# Patient Record
Sex: FEMALE | Race: WHITE
[De-identification: names, ages, dates, MRNs, and addresses within clinical notes are randomized per-mention and may not be internally consistent; named-entity substitution may affect disease eponyms.]

---

## 2017-05-10 NOTE — REPUSA
CLINICAL HISTORY:  Medicare, fall, head injury.

 

TECHNIQUE:  Multiple axial CT images were obtained through the cervical spine without IV contrast mat
erial.  MPR coronal and sagittal sequences were obtained.

 

COMMENTS:

There is no fracture visualized.  The paraspinal soft tissues are unremarkable.  There are no lytic o
r blastic lesions.

 

Grade 1 anterolisthesis of C3 over C4 measures 3 mm.  There is grade 1 anterolisthesis of C4 over C5 
measures 2 mm.  Advanced multilevel degenerative spondylosis is more severe at C4-C5, C5-C6.  At this
 level, there is moderate-to-severe loss of disc space height.  There is posterior-anterior bridging 
osteophytosis, Schmorl's nodes and endplate sclerosis.

 

At C3-C4 through C5-C6 broad-based disc osteophyte complex is present.  There is moderate-to-severe b
ilateral foraminal stenosis and mild-to-moderate canal stenosis.

 

Note is made of a sclerotic lesion measuring 7 mm located in the posterior aspect of C7 vertebral bod
y to the right of the midline.  Consider further evaluation with MRI cervical spine pre- and postcont
rast.  In conjunction with findings on CT of the brain, consider additional evaluation with bone scan
.

 

Incidental note is made of severe biapical fibronodular scarring and scattered emphysematous changes.
  Consider also additional evaluation with chest CT.

 

IMPRESSION:

1. Grade 1 anterolisthesis of C3 over C4 measures 3 mm.  There is grade 1 anterolisthesis of C4 over 
C5 measures 2 mm.  Advanced multilevel degenerative spondylosis is more severe at C4-C5, C5-C6.  At t
his level, there is moderate-to-severe loss of disc space height.  There is posterior-anterior bridgi
ng osteophytosis, Schmorl's nodes and endplate sclerosis.  At C3-C4 through C5-C6 broad-based disc os
teophyte complex is present.  There is moderate-to-severe bilateral foraminal stenosis and mild-to-mo
derate canal stenosis.

2. A sclerotic lesion measuring 7 mm located in the posterior aspect of C7 vertebral body to the righ
t of the midline.  Consider further evaluation with MRI cervical spine pre- and postcontrast.  In con
junction with findings on CT of the brain, consider additional evaluation with bone scan.

3. Severe biapical fibronodular scarring and scattered emphysematous changes.  Consider also addition
al evaluation with chest CT.

 

 

Thank you for your kind referral of this patient. We appreciate the opportunity to participate in thi
s patient's care.

     Electronically signed by JNONATHAN EWING MD on 05/10/2017 08:28:18 PM ET

## 2017-05-10 NOTE — REPUSA
CLINICAL HISTORY: Fall.

TECHNIQUE: Multiple axial CT images were obtained through the brain without IV contrast material.

COMMENTS: 

There is normal configuration of sella turcica. There are no intra or extra-axial collections. There 
is no mass effect or midline shift. There is no evidence of hematoma formation. No hydrocephalus is p
resent. The ventricles are symmetrical. No abnormal calcifications are present. 

There is diffuse age-appropriate cerebellar and cerebral atrophy with proportionally dilated ventricl
es and cortical sulci. 

There are bilateral periventricular and subcortical white matter hypolucencies compatible with mild c
hronic microvascular disease.

Otherwise, no significant focal abnormalities are seen either in the posterior fossa or supratentoria
l compartment.

Note is made of a 12 mm sclerotic lesion in the right skull base which is not fully evaluated.  Consi
josie further evaluation with MRI pre-and postcontrast.

No evidence of fracture.

IMPRESSION:

1. Age-appropriate cerebellar and cerebral atrophy.

2. Mild chronic microvascular disease.

3. No evidence of acute intracranial pathology. 

4. 12 mm sclerotic lesion in the right skull base which is not fully evaluated.  Consider further santana
luation with MRI pre-and postcontrast.

Thank you for your kind referral of this patient.

 

     Electronically signed by JONNATHAN EWING MD on 05/10/2017 07:50:04 PM ET

## 2017-05-11 NOTE — REP
Rib pain after fall.

 

PRIORS:  None.

 

The bones are markedly demineralized.  There is respiratory motion artifact.  A

right lower rib fracture cannot be ruled out.  In addition, there is a subtle

lucency seen involving the scapula.  I cannot assess for fracture on this exam.

 

 

IMPRESSION:

 

Limited exam.  Fractures, as described above, cannot be ruled out.

 

 

Signed by

Vasyl Quintero DO 05/11/2017 03:36 P

## 2017-05-12 NOTE — ED PDOC
Post-Departure Follow-Up


leisa winston fasxed formal report of ct head and c spine for fu mlg Lundborg-Gray,Maja MD May 12, 2017 14:27

## 2017-07-20 NOTE — REP
Left humerus:  Two views.

 

History:  Trauma.

 

Findings:  Two views of the left humerus demonstrate an angulated and comminuted

fracture of the proximal diaphysis of the left humerus.  There is apex medial

angulation and some override.  There is diffuse osteoporosis.

 

 

Signed by

Ruben Maynard MD 07/20/2017 04:39 P

## 2017-07-21 NOTE — REP
Left humerus two views:

 

There is a spiral fracture of the midshaft with angulation, overlapping and

displacement of the fracture fragments.

 

 

Signed by

Tobin Lopez MD 07/21/2017 07:37 A

## 2017-07-21 NOTE — ECGEPIP
Stationary ECG Study

                              [facility]

                                       

                                       Test Date:    2017

Pat Name:     LAURO OJEDA            Department:   

Patient ID:   [id number]                 Room:         -

Gender:       F                        Technician:   PALLAVI

:          1929               Requested By: KATHARINA Mae

Order Number: [id number]     Reading MD:   Mich Zelaya

                                 Measurements

Intervals                              Axis          

Rate:         80                       P:            30

DC:           184                      QRS:          -30

QRSD:         94                       T:            38

QT:           379                                    

QTc:          439                                    

                           Interpretive Statements

SINUS RHYTHM

BORDERLINE LEFT AXIS DEVIATION

 

Electronically Signed On 2017 22:38:33 EDT by Mich Zelaya

## 2017-07-22 NOTE — HPE
DATE OF ADMISSION:  07/21/2017

 

REASON FOR ADMISSION:  Probable grade 1 open fracture proximal humerus left

shoulder.

 

HISTORY OF PRESENT ILLNESS:  She is an 88-year-old right hand dominant female 
who

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

the emergency room at [facility] yesterday morning.  She was seen

by the emergency room physician, Dr. Ruano, who called me and I had her come up

to the office and evaluate her and found to have a proximal one-third long

oblique somewhat comminuted fracture of the left proximal humerus and she was

neurologically intact and not her injuries.  She has just been recovering from

rib fractures.  She did have a bruise on her left chin, but there are no other

neurologic troubles or complaints of pain or soreness and she was placed in a

hanging arm cast with a collar and cuff device.

 

She was sent home from the office and then later that afternoon our cast tech

brought to my attention that when she was done applying the cast that there was 
a

small tiny prick of blood in the shoulder area and put a Band-Aid on it.  
Because

of that I thought that she should be reevaluated as this could have possibly 
become

and open fracture.  I called yesterday afternoon to no avail and then called 
last

in the evening and finally contacted Mrs. Wilson and told her my concern, that we

should reevaluate her, but she did not have any available transportation from up

in [place] until the morning because her son and daughter-in-law were out of

town and instructions were made to not eat breakfast and I will meet her in the

emergency room as early as possible in the morning.  I notified the operating

room to prepare for potential open reduction internal fixation (ORIF) IND of the

left proximal humerus fracture for her.  So, those arrangements were made.

 

This morning, I was evaluating her x-rays again and noted that there were x-rays

again taken of her during the evening and it turns out that her daughter when

they got home from out of town noted that there was bleeding on her gown and arm

and called the on-call physician who instructed her to call 911 and come back to

the emergency room, which she did last evening and was evaluated by the 
emergency

room staff.  There were x-rays done at 2:43 a.m. and was discharged home.  So, I

called the emergency room physician this morning to discuss what he found and he

thought there was just a small amount of bleeding, possibly and abrasion and did

not feel it was an open fracture.

 

Eventually, I was able to get a hold of Mrs. Wilson and her family again this

morning.  I also discussed this with Dr. Cox the on call physician last night
, who also communicated with the ER physician last night.

The daughter states that a small amount of blood was squirting from the armpit

area onto her sweater and her clothes.  I said because of that, I need her to 
get

right back to the hospital again, even though they just left and got home,

because I am suspicious that this has become an open fracture and she would

likely benefit to having this irrigated, debrided and stabilized surgically, so

they were bringing her back in this morning and I am examining her now in the

preoperative holding area.

 

PAST MEDICAL HISTORY:  Otherwise significant for:

High blood pressure.

Anxiety.

Hyperlipidemia.

Mild renal insufficiency.

Hypercholesterolemia.

Osteoporosis.

Vitamin D deficiency.

History of cataracts.

Cystocele.

 

MEDICATIONS:

- Catapres

- hydrochlorothiazide

- metoprolol

- irbesartan

- isosorbide

- simvastatin

- Wellbutrin

- Drisdol

 

PAST SURGICAL HISTORY:

Total abdominal hysterectomy and bilateral salpingo-oophorectomy.

 

SOCIAL HISTORY:  She does not smoke or drink alcohol excessively.  She lives

alone.  She used to smoke cigarettes but quit many years ago.  She has a son and

daughter-in-law who live just down the road from her in  [place], [place].

Marilin Guille cares for her medically in [place].

 

ALLERGIES:  No known drug allergies.

 

REVIEW OF SYSTEMS HEALTH SURVEY:  Amended to the chart and per the last note 
from

06/29/2017 from Marilin Han.  No other real changes.

 

PHYSICAL EXAMINATION:

When I examine her, she is an alert, pleasant, elderly female.  She is

ambulatory.

VITAL SIGNS:  Temperature 97, blood pressure 178/78, pulse 82, respirations 18,

oxygen saturation 93% on room air.

HEENT:  Benign.

LUNGS:  Clear to auscultation.

HEART:  Regular.  I did not detect a murmur.

ABDOMEN:  Nontender.

EXTREMITIES:  Her left arm was swollen and ecchymotic and there was a question 
of

whether or not there is a small pinhole barely perceptible, underneath a

Band-Aid.  I could express just a very small amount of blood from that area

distally.  She is in her long arm hanging cast.  She could bend and straighten

her fingers.  Normal sensation.  Good capillary refill. No motor strength loss

was noted.

 

Her radiographs taken at [facility] last evening were reviewed as

well as the ones yesterday morning.

 

IMPRESSION:  This is an unstable comminuted proximal one-third humerus fracture,

probably a grade 1 open fracture that has become open and I would recommend to

her, and I discussed with her son and daughter-in-law the situation and that is

likely best treated open because of the potential long term risk of infection 
and also would probably help

her heal this better and more comfortably because it is very unstable, but the

risk of doing this of course is a risk of having surgery.  There is risk of

infection, damage to the nerves, blood vessels, anesthetic complications, damage

to the blood vessels and nerves amongst other risks.  They understand this and

Mrs. Wilson understands this, and so she agrees.  She has signed the consent and

we are trying to get her ready for surgery.

 

I have discussed this with the operating room staff as well as the 
representative

from the implant company, [employer], to make sure we have the appropriate

implants available.  I have talked this over with Dr. Neo Cox, orthopedic

surgeon, who may have a chance to come and assist me with this as well.  We plan

to proceed as soon as she is ready.  Labs and EKG are presently pending.

MELODY

## 2017-07-23 NOTE — RO
DATE OF PROCEDURE:  07/22/2017

 

PREPROCEDURE DIAGNOSIS:  Grade 1 open left proximal one-third humerus fracture.

 

POSTPROCEDURE DIAGNOSIS:  Grade 1 open left proximal one-third humerus fracture.

 

 

PROCEDURE:

1.  Irrigation and debridement of grade 1 open proximal humerus fracture.

2.  Open reduction, internal fixation of left proximal one-third humerus fracture

using a proximal humeral locking plate by [employer].

 

SURGEON:  Dr. KATHARINA Nath

 

ASSISTANT:  Neo Cox MD

 

ANESTHESIA:  General endotracheal tube anesthesia.

 

COMPLICATIONS:  None.

 

ESTIMATED BLOOD LOSS:  200 mL.

 

DESCRIPTION OF PROCEDURE:  She was given Kefzol intravenously preoperatively,

then a general endotracheal tube anesthetic was established.  Nickerson catheter was

placed.  She was positioned on the Dustin table and a padded Kennedy stand was

utilized.  A small bump was placed under the left scapula, then her left upper

extremity was carefully prepped and draped in the usual sterile fashion.

 

After appropriate time-out was confirmed, a longitudinal incision was made on the

anterior aspect of the upper arm for an anterolateral deltopectoral approach to

this fracture.  Bovie cautery was used to coagulate the crossing vessels. The

cephalic vein was identified at the deltopectoral interval and the vein was

retracted medially.  The perforating vessels were coagulated.  We developed the

deltopectoral interval around the subacromial space and then distally the biceps

muscle was carefully reflected and retracted medially following along toward the

deltoid insertion distally and then the oblique fracture fragment was noted to

extend through the deltoid incision.  The distal fragment was then identified

deep in the wound and at this point, the brachioradialis was encountered and we

carefully split the brachioradialis down to bone.  We carefully subperiosteally

dissected medially and laterally and then irrigated out the fracture site and

removed all the excess of hematoma.  We utilized the pulse lavage and a 3 liter

bag was used that contained Kefzol antibiotic.

 

Once we thoroughly cleaned the fracture site, an open anatomic line to line

reduction was performed and held with first a small alligator type clamp and

then, once were satisfied with the reduction, we brought in a 8-hole proximal

humeral locking plate into the field and laid it across the fracture and it

appeared to be the appropriate size.  Thus, we used a Verbrugge bone holding

clamp to substitute for the alligator clamp and re-reduced the fracture holding

the plate onto the bone, and then fluoroscopic imaging at this point was obtained

to make sure that we had good alignment of the plate on the bone as well as

proximal distal alignment especially regarding the locking plate screw holes

proximal in the humeral head.  A small guidepin was placed on the top of the head

and we adjusted the plate appropriate so we got good alignment.  We then fixed

the plate into position on either side of the fracture using 3.5 cortical

standard compression screws.  A total of four were placed and then, we place the

interfragmentary compression screw obliquely across the oblique portion of the

fracture fragment using the 2.5 and 3.5 drill, and using a cortical 3.5 screw.

 

At this point, we removed the Verbrugge clamp and then turned our attention

toward the proximal locking screws in the humeral head.  Using the drill sleeves

and then using fluoroscope imaging, we drilled individually each locking screw to

be sure we were at the right depth and the right angle, measured and placed the

locking screws in the humeral head.  Several were placed all with excellent

position.  Then, we confirmed good position by rotating in a lateral, coronal and

saggital planes with the fluoroscope.  The distal holes and the plate were then

filled with 3.5 cortical screws as well.  All screws were then secondarily

tightened to be sure we had good cortical fixation.  We were very satisfied with

the reduction and placement of all the hardware.  We then copiously irrigated

once again the wound and then, we tried to repair the deltoid insertion across

the plate in the mid portion of the incision and then irrigated again and closed

the deep subdermal tissues with interrupted Vicryl sutures and skin was closed

with staples covered by adaptic dry sterile bulky dressing.  She was placed into

a sling and then awakened from general endotracheal tube anesthesia after having

tolerated the procedure well, transferred to the recovery room in stable

condition.  There were no intraoperative complications.

## 2017-09-28 NOTE — REP
C-ARM VIEWS, RIGHT HIP:

 

Four C-ARM views of the right hip were performed during placement of metallic

internal fixation. 77 seconds fluoroscopy time was utilized.

 

 

Signed by

Tobin García MD 09/28/2017 07:57 P

## 2017-09-28 NOTE — ECGEPIP
Stationary ECG Study

                           [facility] - ED

                                       

                                       Test Date:    2017

Pat Name:     LAURO OJEDA            Department:   

Patient ID:   [id number]                 Room:         -

Gender:       F                        Technician:   JT

:          1929               Requested By: KASSIDY PEREZ

Order Number: [id number]     Reading MD:   Deb Ny

                                 Measurements

Intervals                              Axis          

Rate:         77                       P:            89

VA:           183                      QRS:          -23

QRSD:         97                       T:            79

QT:           407                                    

QTc:          463                                    

                           Interpretive Statements

SINUS RHYTHM

NSTTW ABNORMALITY

LOW VOLTAGE LIMB

BASELINE ARTIFACT LIMITS INTERPRETATION

Electronically Signed On 2017 20:48:06 EDT by Deb Ny

## 2017-09-28 NOTE — CR
DATE OF CONSULTATION:   09/28/2017

 

REQUESTING PROVIDER:  Dr. Bradford

 

CHIEF COMPLAINT:   Right hip fracture.

 

HISTORY:  This an 88-year-old woman who fell earlier this morning injuring her

right hip.  She is noted to have a displaced intertrochanteric right hip

fracture.  Other workup so far for fractures has been negative, although a knee

x-ray is pending.  She denies any other injury other than some mild right knee

pain.

 

PAST MEDICAL HISTORY:  Her past medical history is notable for hypertension,

hypercholesterolemia.  She apparently has been cleared from a medical standpoint.

 

 

MEDICATIONS:  At home include:

- vitamin D

- clonidine

- bupropion

- metoprolol

- isosorbide mononitrate

- hydrochlorothiazide

- irbesartan

- calcium

 

ALLERGIES:  No known drug allergies.

 

REVIEW OF SYSTEMS:

Denies any current chest pain, shortness of breath.  Denies any other

musculoskeletal injuries.

Cardiac:  As noted above with some hypertension.  Denies taking any blood

thinners.

 

PHYSICAL EXAMINATION:

She is alert, oriented.  No acute distress.

HEENT:  Extraocular muscles intact.  Pharynx benign.

HEART:  She has a regular rate and rhythm to her pulse.

LUNGS:  Nonlabored breathing.

ABDOMEN:  Benign abdomen.

EXTREMITIES:  Her right lower extremity demonstrates some shortening and external

rotation.  She moves her toes well and has good capillary refill distally.  Does

have multiple varicosities.  She has some mild swelling and redness over the

anterior aspect of her proximal tibia and tibial tubercle region, presumably

where she landed, but is not particularly tender to palpation around the knee.

Hip range of motion was not performed on the right.  She did not seem to be

irritable to range of motion on the left.

 

Radiographs reviewed of her pelvis and both hips.  She has a displaced

intertrochanteric fracture of her right hip.  She apparently had previously

declined a knee x-ray on the right but I am going to try to reorder that and I

have talked to her about that.

 

IMPRESSION:  Right intertrochanteric hip fracture in an 88-year-old woman.

 

RECOMMENDATIONS:  Suggested that we proceed with surgical treatment for this in

order to get her more comfortable and try to get her up ambulating.  She lives

with some family members in what sounds like a duplex type house.  She wished to

go ahead with this.  She understands the nature of this, the risks of bleeding,

infection, damage to nerves, vessels, persistent pain, malunion, nonunion, loss

of reduction, blood clots, medical problems, death, among others.   We will plan

on proceeding with open reduction, internal fixation (ORIF) of the right hip,

most likely with an IM nail, such as a [employer] TFN nail, possibly a plate and

screws.

## 2017-09-28 NOTE — REP
CT Head without contrast

 

HISTORY:  Fall

 

COMPARISON: 05/10/2017

 

Areas of decreased attenuation are present in the periventricular white matter.

This represents small-vessel ischemic disease.  There is no intraparenchymal

hemorrhage, acute infarct,  mass or midline shift. The ventricular system and

cortical sulci as well as subarachnoid space in the posterior fossa are dilated

consistent with minimal volume loss. There is no extra cerebral collection.

There is no fracture.  The visualized sinuses are clear.

 

IMPRESSION:

1.  Small vessel ischemic disease.

2.  Minimal volume loss.

 

 

Signed by

Claudio Jon MD 09/28/2017 09:34 A

## 2017-09-28 NOTE — REP
Chest one-view

 

HISTORY: Chest pain

 

Comparison: 05/10/2017

 

The lungs are clear. The cardiac silhouette is enlarged. The pulmonary

vasculature is normal in appearance.

 

Impression:  Cardiomegaly.

 

 

Signed by

Claudio Jon MD 09/28/2017 10:39 A

## 2017-09-28 NOTE — REP
BILATERAL HIPS:

 

AP and lateral views of bilateral hips performed.  There is an intertrochanteric

fracture of the proximal right femur with varus deformity.  No dislocation is

seen.  There is no fracture or dislocation of the left hip.

 

 

Signed by

Tobin García MD 09/28/2017 07:50 P

## 2017-09-29 NOTE — REP
Clinical:  Trauma.  Fall.

 

Technique:  Single AP view of the pelvis.

 

Findings:

Right femoral neck fracture noted.  Age-related osteopenia and degenerative

changes throughout the visualized lower lumbar spine, pelvis and hips.

 

Impression:

Acute right femoral neck fracture.

 

 

Signed by

Stuart De La Fuente MD 09/29/2017 01:33 A

## 2017-09-29 NOTE — REP
Right hip two views postoperative study:

 

There is gamma nail internal fixation of an intertrochanteric fracture with the

hardware and fracture in satisfactory positions alignment both projections.

 

 

Signed by

Toibn Lopez MD 09/29/2017 10:33 A

## 2017-09-29 NOTE — RO
DATE OF PROCEDURE:  09/28/2017

 

PREOPERATIVE DIAGNOSIS:  Right intertrochanteric hip fracture.

 

POSTOPERATIVE DIAGNOSIS: Right intertrochanteric hip fracture.

 

PROCEDURE PERFORMED:  Open reduction internal fixation of right intertrochanteric

hip fracture with a Synthes TFNA nail

 

SURGEON: Dr. Joseph Rodney.

 

ASSISTANT:

 

ANESTHESIA:  Spinal

 

ESTIMATED BLOOD LOSS:  Less than 100.

 

COMPLICATIONS:  None.

 

INDICATIONS:  This is an 88-year-old woman who fell earlier today injuring her

right hip.  She had a displaced intertrochanteric hip fracture.  She wished to go

ahead with  surgical treatment.  Preop medical clearance was obtained.  She

understood the nature of the procedure as did her family,  the risks of bleeding,

infection, damage to nerves, vessels, persistent pain, malunion, nonunion, loss

of reduction, blood clots, medical problems, death among others.  They understood

that this was a significant injury for somebody this age and it could be very

hard to recover from this both medically and to get people back to previous level

of function.  They wished proceed.

 

DESCRIPTION OF PROCEDURE:  The patient taken to the operating room and  placed on

the fracture table in the usual position.  All areas were padded appropriately.

The spinal anesthesia had been induced.  The right hip was prepped and draped the

usual sterile fashion.  Time-out was performed.  Prior to this, I had taken

several C-arm images and made sure I could visualize the hip okay, which I was

able to do so. The reduction was obtained by placing some gentle traction and

internal rotation on the extremity.

 

Once the hip was prepped and draped, a longitudinal incision was made which was

quite short at the tip of the trochanter.  I then dissected down through the

fascia champ and placed a guide on the tip of the trochanter, advanced the

guidewire and down the canal of the femur, which went in nicely.  I confirmed on

the lateral view that it was down the femur and [id number] on the tip of the

trochanter.  I then used the initiating drill to make a hole the proximal femur,

switched out he guidewires and advanced in the short 11 mm, 130 degree  TFNA

nail.  This was tapped down to a level that I felt was appropriate for the

guidewire to go up into the center of the head.  I actually tapped it in slightly

too far and backed it out slightly.   Then made an incision along the lateral

side of the femur for the guide for the blade to go in.  This was brought up

against the side of the femur and I advanced a guidewire in, made sure it was

centered in both planes in the AP and lateral and then drilled up to 100 mm and

then placed a 95 mm blade, impacted this down and made sure it was seated against

the lateral aspect of the femur.  It looked to be in an excellent position on AP

and lateral in terms of the head. It was within a centimeter of the apex of the

femoral head.  The screw was then used to tighten the inside of the nail down to

the blade and backed off half a turn.  I then placed a locking screw distally by

placing the guide up against the lateral aspect of the femur, drilling, measuring

and placed an appropriate length screw through the daniel.  Final imaging was used

to confirm that the fracture was reduced in anatomic position.  The hardware was

excellent position and the hardware was of appropriate length.

 

I irrigated copiously, closed the deep layer with #1-0 Vicryl suture.  The subcu

with #2-0 Vicryl and the skin with staples.  She was taken to recovery room in

stable condition.  There were no known complications.  The plan will be routine

postop. I am going to go with partial weightbearing with her, postop antibiotics,

deep venous thrombosis (DVT) prophylaxis per routine.

## 2017-09-29 NOTE — IPNPDOC
Text Note


Date of Service


The patient was seen on 9/29/17.





NOTE


Subjective:


Patient is an 88 year old  female with a PMHx of HTN, DLP, CKD3, 

Osteoporosis, Vitamin D deficiency, Depression / Anxiety and Hx of a Cystocele 

who presented to the ER with complaints of right hip pain after she had fallen 

at home. She was found to have a right hip fracture and hospitalist team was 

called to admit, orthopedic surgery was consulted for repair that was completed 

on 9/28/17.





Patient was seen and examined at the bedside. She denies any pain at this time. 

She notes that she will be working with physical therapy today. 





Objective:


Vitals (See below)


General: Lying in bed, no acute distress, comfortable, AAOx3


HEENT: NC, AT


CVS: RRR, +S1S2


Lungs: Fair air entry b/l, -w/r/r


Abdomen: Soft, ND, NT


Extremities: - Edema, - Calf tenderness





Assessment and plan:


Right hip pain - likely 2/2 right hip fracture at femoral neck - s/p ORIF (9/28/ 17 - POD #1)


- Presented after a mechanical fall at home and right hip pain, found to have 

fracture in ER


- Physial with mild right hip pain 


- Dr. Rodney consulted for surgery; pain control and anticoagulation as per 

orthopedic surgery





Elevated WBC / Elevated Platelet Count / Elevated Hg  - possibly 2/2 reactive 

etiology and hemoconcentration, possibly 2/2 hematologic malignancy


- Labs compared to 7/2017 appear consistent with current labs


- Afebrile and review of systems remains negative


- Peripheral smear appears to have a reactive process; no evidence of blast 

cells


- LDH mildly elevated, LAP pending 


- s/p IV fluid hydration


- Will continue to follow





Elevated PT


- Corrected with mixing study; indicating possible deficiency


- Factor VIII and IX pending, Factor 8 function, vWF and Ristocetin Cofactor 

test


- No evidence of bleeding or thrombosis 


- Awaiting lab work 





HTN


- BP remains well controlled 


- c/w Clonidine patch and Metoprolol


- Will hold Irbesartan and HCTZ





DLP


- c/w Simvastatin 





s/p Elevated Cr on CKD3


- Cr baseline appears to be between 1.2 and 1.3


- Cr better than baseline at this time 


- s/p IV fluid hydration 





Osteoporosis


- Will need to f/u outpatient with PCP 





Vitamin D deficiency


- Will hold supplementation 





Depression / Anxiety


- c/w Bupropion 





Hx of a Cystocele





DVT prophylaxis 


- Anticoagulation as per surgery





VS,Fishbone, I+O


VS, Fishbone, I+O


Laboratory Tests


9/29/17 06:28








Red Blood Count 5.98 H, Mean Corpuscular Volume 79.9 L, Mean Corpuscular 

Hemoglobin 25.3 L, Mean Corpuscular Hemoglobin Concent 31.6 L, Red Cell 

Distribution Width 13.4, Monocytes # (Auto) , Calcium Level 8.3 L, Aspartate 

Amino Transf (AST/SGOT) 16, Alanine Aminotransferase (ALT/SGPT) 16, Alkaline 

Phosphatase 72, Total Bilirubin 0.6, Total Protein 5.4 L, Albumin 2.8 L








Vital Signs








  Date Time  Temp Pulse Resp B/P (MAP) Pulse Ox O2 Delivery O2 Flow Rate FiO2


 


9/29/17 10:23   16     


 


9/29/17 09:53  66  113/65    


 


9/29/17 06:00 97.6    96 Nasal Cannula 2.0 

















TOLU METZ MD Sep 29, 2017 13:41

## 2017-09-29 NOTE — REP
Clinical:  Trauma.  Rule out fracture.

 

Technique:  AP and lateral views of the right knee.

 

Findings:

Moderate/early advanced tricompartmental osteoarthritic degenerative changes are

appreciated.  Findings include cortical irregularity, subtle early spurring,

subchondral sclerosis.  No acute fracture or dislocation identified.  No definite

effusion.

 

Impression:

Tricompartmental osteoarthritic degenerative changes.

No acute fracture or dislocation identified.

 

 

Signed by

Stuart De La Fuente MD 09/29/2017 01:10 A

## 2017-09-30 NOTE — IPNPDOC
Text Note


Date of Service


The patient was seen on 9/30/17.





NOTE


Subjective:


Patient is an 88 year old  female with a PMHx of HTN, DLP, CKD3, 

Osteoporosis, Vitamin D deficiency, Depression / Anxiety and Hx of a Cystocele 

who presented to the ER with complaints of right hip pain after she had fallen 

at home. She was found to have a right hip fracture and hospitalist team was 

called to admit, orthopedic surgery was consulted for repair that was completed 

on 9/28/17.





Patient was seen and examined at the bedside. She notes that she worked with 

physical therapy yesterday and has moved around with the walker. She denies any 

other problems. 





Objective:


Vitals (See below)


General: Lying in bed, no acute distress, comfortable, AAOx3


HEENT: NC, AT


CVS: RRR, +S1S2


Lungs: Fair air entry b/l, -w/r/r


Abdomen: Soft, ND, NT


Extremities: - Edema, - Calf tenderness





Assessment and plan:


Right hip pain - likely 2/2 right hip fracture at femoral neck - s/p ORIF (9/28/ 17 - POD #2)


- Presented after a mechanical fall at home and right hip pain, found to have 

fracture in ER


- Physial with mild right hip pain 


- Dr. Rodney consulted for surgery; pain control and anticoagulation as per 

orthopedic surgery





Elevated WBC / Elevated Platelet Count / Elevated Hg  - possibly 2/2 reactive 

etiology and hemoconcentration, possibly 2/2 hematologic malignancy


- Labs compared to 7/2017 appear consistent with current labs


- Afebrile and review of systems remains negative


- Peripheral smear appears to have a reactive process; no evidence of blast 

cells


- LDH mildly elevated, LAP pending 


- s/p IV fluid hydration


- Will continue to follow





Elevated PT


- Continues to remain elevated 


- Corrected with mixing study; indicating possible deficiency


- Factor VIII and IX pending, Factor 8 function, vWF and Ristocetin Cofactor 

test


- No evidence of bleeding or thrombosis 


- Awaiting lab work 





HTN


- BP remains well controlled 


- c/w Clonidine patch and Metoprolol


- Will hold Irbesartan and HCTZ





DLP


- c/w Simvastatin 





s/p Elevated Cr on CKD3


- Cr baseline appears to be between 1.2 and 1.3


- Cr better than baseline at this time 


- s/p IV fluid hydration 





Osteoporosis


- Will need to f/u outpatient with PCP 





Vitamin D deficiency


- Will hold supplementation 





Depression / Anxiety


- c/w Bupropion 





Hx of a Cystocele





DVT prophylaxis 


- Anticoagulation as per surgery





VS,Fishbone, I+O


VS, Fishbone, I+O


Laboratory Tests


9/30/17 06:23








Red Blood Count 5.76 H, Mean Corpuscular Volume 79.3 L, Mean Corpuscular 

Hemoglobin 25.2 L, Mean Corpuscular Hemoglobin Concent 31.7 L, Red Cell 

Distribution Width 13.7, Monocytes # (Auto) , Calcium Level 8.1 L, Aspartate 

Amino Transf (AST/SGOT) 12 L, Alanine Aminotransferase (ALT/SGPT) 13, Alkaline 

Phosphatase 66, Total Bilirubin 0.6, Total Protein 5.4 L, Albumin 2.7 L








Vital Signs








  Date Time  Temp Pulse Resp B/P (MAP) Pulse Ox O2 Delivery O2 Flow Rate FiO2


 


9/30/17 08:15    143/60    


 


9/30/17 06:00 98.8 77 18  93 Room Air  


 


9/29/17 06:00       2.0 

















TOLU METZ MD Sep 30, 2017 11:35

## 2017-10-01 NOTE — IPNPDOC
Text Note


Date of Service


The patient was seen on 10/1/17.





NOTE


Subjective:


Patient is an 88 year old  female with a PMHx of HTN, DLP, CKD3, 

Osteoporosis, Vitamin D deficiency, Depression / Anxiety and Hx of a Cystocele 

who presented to the ER with complaints of right hip pain after she had fallen 

at home. She was found to have a right hip fracture and hospitalist team was 

called to admit, orthopedic surgery was consulted for repair that was completed 

on 9/28/17.





Patient was seen and examined at the bedside. She has been working with 

physical therapy and has been making progress. Advised that we will continue to 

work with physical therapy until we can establish clearance, possibly placement 

in sub-acute rehab if required. 





Objective:


Vitals (See below)


General: Lying in bed, no acute distress, comfortable, AAOx3


HEENT: NC, AT


CVS: RRR, +S1S2


Lungs: Fair air entry b/l, -w/r/r


Abdomen: Soft, ND, NT


Extremities: - Edema, - Calf tenderness, R hip appears clean





Assessment and plan:


Right hip pain - likely 2/2 right hip fracture at femoral neck - s/p ORIF (9/28/ 17 - POD #3)


- Presented after a mechanical fall at home and right hip pain, found to have 

fracture in ER


- Improvement in pain; hip appears clean 


- Dr. Rodney consulted for surgery; pain control and anticoagulation as per 

orthopedic surgery





Elevated WBC / Elevated Platelet Count / Elevated Hg  - possibly 2/2 reactive 

etiology and hemoconcentration, possibly 2/2 hematologic malignancy


- Labs compared to 7/2017 appear consistent with current labs


- Afebrile and review of systems remains negative


- Peripheral smear appears to have a reactive process; no evidence of blast 

cells


- LDH mildly elevated, LAP pending 


- s/p IV fluid hydration


- Will continue to follow





Elevated PT


- Continues to remain elevated 


- Corrected with mixing study; indicating possible deficiency


- Factor VIII / IX level, Factor 8 function, vWF and Ristocetin Cofactor test 

remain pending 


- No evidence of bleeding or thrombosis 


- Awaiting lab work 





HTN


- BP remains well controlled 


- c/w Clonidine patch and Metoprolol


- Will hold Irbesartan and HCTZ





DLP


- c/w Simvastatin 





s/p Elevated Cr on CKD3


- Cr baseline appears to be between 1.2 and 1.3


- Cr better than baseline at this time 


- s/p IV fluid hydration 





Osteoporosis


- Will need to f/u outpatient with PCP 





Vitamin D deficiency


- Will hold supplementation 





Depression / Anxiety


- c/w Bupropion 





Hx of a Cystocele





DVT prophylaxis 


- Anticoagulation as per surgery





Disposition:


- Awaiting PT clearance


- Will discuss with PMD about continuing workup as outpatient





VS,Fishbone, I+O


VS, Fishbone, I+O


Laboratory Tests


10/1/17 05:15








Red Blood Count 5.69 H, Mean Corpuscular Volume 80.5, Mean Corpuscular 

Hemoglobin 25.0 L, Mean Corpuscular Hemoglobin Concent 31.0 L, Red Cell 

Distribution Width 13.4, Monocytes # (Auto) , Calcium Level 8.3 L, Aspartate 

Amino Transf (AST/SGOT) 14 L, Alanine Aminotransferase (ALT/SGPT) 11 L, 

Alkaline Phosphatase 65, Total Bilirubin 0.7, Total Protein 4.9 L, Albumin 2.6 L








Vital Signs








  Date Time  Temp Pulse Resp B/P (MAP) Pulse Ox O2 Delivery O2 Flow Rate FiO2


 


10/1/17 08:43  71  115/56    


 


10/1/17 06:00 96.7  18  96 Room Air  


 


9/29/17 06:00       2.0 

















TOLU METZ MD Oct 1, 2017 13:47

## 2017-10-02 NOTE — IPNPDOC
Text Note


Date of Service


The patient was seen on 10/2/17.





NOTE


Subjective:


Patient is an 88 year old  female with a PMHx of HTN, DLP, CKD3, 

Osteoporosis, Vitamin D deficiency, Depression / Anxiety and Hx of a Cystocele 

who presented to the ER with complaints of right hip pain after she had fallen 

at home. She was found to have a right hip fracture and hospitalist team was 

called to admit, orthopedic surgery was consulted for repair that was completed 

on 9/28/17.





Patient was seen and examined at the bedside. She notes that she has been 

working with physical therapy. She continues to make progress. Denies any other 

problems. 





Objective:


Vitals (See below)


General: Lying in bed, no acute distress, comfortable, AAOx3


HEENT: NC, AT


CVS: RRR, +S1S2


Lungs: Fair air entry b/l, -w/r/r


Abdomen: Soft, ND, NT


Extremities: - Edema, - Calf tenderness, R hip appears clean





Assessment and plan:


Right hip pain - likely 2/2 right hip fracture at femoral neck - s/p ORIF (9/28/ 17 - POD #4)


- Presented after a mechanical fall at home, found to have fracture in ER


- Dr. Rodney consulted for surgery


- Has improvement in pain


- pain control and anticoagulation as per orthopedic surgery


- c/w PT and determine disposition 





Elevated WBC / Elevated Platelet Count / Elevated Hg  - possibly 2/2 reactive 

etiology and hemoconcentration, possibly 2/2 hematologic malignancy


- Labs compared to 7/2017 appear consistent with current labs


- Afebrile and review of systems remains negative


- Peripheral smear appears to have a reactive process; no evidence of blast 

cells


- LDH mildly elevated, LAP score normal 


- Will restart IV fluid hydration


- Will continue to follow CBC trend 





Elevated PTT


- Continues to remain elevated 


- Corrected with mixing study; indicating possible deficiency


- Factor VIII / IX level, Factor 8 function, vWF and Ristocetin Cofactor test 

remain pending 


- No evidence of bleeding or thrombosis 


- Awaiting lab work 





HTN


- BP remains well controlled 


- c/w Clonidine patch and Metoprolol


- Will hold Irbesartan and HCTZ





DLP


- c/w Simvastatin 





Re-Elevated Cr on CKD3


- Cr baseline appears to be between 1.2 and 1.3


- Cr has shown slight elevation from yesterday


- Will restart IV fluid hydration 





Osteoporosis


- Will need to f/u outpatient with PCP 





Vitamin D deficiency


- Will hold supplementation 





Depression / Anxiety


- c/w Bupropion 





Hx of a Cystocele





DVT prophylaxis 


- Anticoagulation as per surgery





Disposition:


- Awaiting PT clearance


- Will discuss with PMD about continuing workup as outpatient





VS,Kelsie, I+O


VS, Kelsie, I+O


Laboratory Tests


10/2/17 07:12








Red Blood Count 5.60 H, Mean Corpuscular Volume 79.6 L, Mean Corpuscular 

Hemoglobin 24.8 L, Mean Corpuscular Hemoglobin Concent 31.2 L, Red Cell 

Distribution Width 13.5, Neutrophils (%) (Auto) 75.3 H, Lymphocytes (%) (Auto) 

5.9 L, Monocytes (%) (Auto) 12.7 H, Eosinophils (%) (Auto) 4.2 H, Basophils (%) 

(Auto) 1.1 H, Neutrophils # (Auto) 13.8 H, Lymphocytes # (Auto) 1.1 L, 

Monocytes # (Auto) 2.3 H, Eosinophils # (Auto) 0.8 H, Basophils # (Auto) 0.2, 

Calcium Level 7.8 L, Aspartate Amino Transf (AST/SGOT) 14 L, Alanine 

Aminotransferase (ALT/SGPT) 12, Alkaline Phosphatase 64, Total Bilirubin 0.9, 

Total Protein 4.9 L, Albumin 2.5 L








Vital Signs








  Date Time  Temp Pulse Resp B/P (MAP) Pulse Ox O2 Delivery O2 Flow Rate FiO2


 


10/2/17 14:00 97.3 56 16 92/54 (67) 93 Room Air  


 


9/29/17 06:00       2.0 














I&O- Last 24 Hours up to 6 AM 


 


 10/3/17





 06:00


 


Intake Total 300 ml


 


Balance 300 ml

















TOLU METZ MD Oct 2, 2017 14:57

## 2017-10-03 NOTE — PMRHPE
DATE OF ADMISSION:  10/03/2017

 

REASON FOR ADMISSION: Rehabilitation of right hip fracture, status post

09/28/2017 fall and then 09/28/2017 open reduction internal fixation of right

intertrochanteric hip fracture with a Synthes TFNA nail.

 

HISTORY OF THE PRESENT ILLNESS: The patient is an 88-year-old white female who 
is

fairly active and was outside walking her dog when she tripped on what she

believed was a tree stump, falling backwards onto her buttock, developing

significant pain in her right hip but not striking her head and having no loss 
of

consciousness or other problems. Her daughter-in-law was unable to get her up

after she was called by the patient and then the patient was taken to the

emergency room after the son arrived home and was able to get the patient into

the car. The patient was evaluated at [facility] 
and

found to be sensory motor intact except for right hip pain, stiffness and

guarding and was evaluated and found to have sustained a right intertrochanteric

fracture. The patient had another fall with left humerus fracture in July 2017,

which also required an open reduction internal fixation. Patient who is

right-handed had basically been living fairly independently, though she does 
live

with her son and daughter-in-law. The patient has been participating in physical

and occupational therapy and making improvements, though she has had a course

somewhat stressed by anemia and required transfusion. The patient, however, had

reached the point where it was felt she would be able to participate in and

benefit from acute intensive rehabilitation to facilitate her return to home.

Therefore, the patient is admitted to the acute rehabilitation unit for a trial

of musculoskeletal rehabilitation today, 10/03/2017.

 

PAST MEDICAL HISTORY:

Includes:

1. Atherosclerotic cardiovascular disease, including hypertension and

hyperlipidemia.

2. Chronic kidney disease, level III.

3. Osteoporosis.

4. Vitamin D deficiency.

5. Depression, anxiety.

6. History of cystocele.

 

PAST SURGICAL HISTORY:

Includes:

Hysterectomy and bilateral salpingo-oophorectomy in 1993.

Bilateral cataract surgery in 2007.

Left humerus fracture and open reduction internal fixation in July of 2017.

 

FAMILY HISTORY:  Noncontributory for this patient.

 

SOCIAL HISTORY:  As noted above, patient who is retired from working in a school

cafeteria, lives with her son and daughter-in-law in [place], [place] and has a

ramp into the house from one entrance and four steps from another, and a couple

areas of 4-inch step-ups in the house to enter different rooms. She does not use

illicit drugs, has a 20 pack-year smoking history but has not smoked in the last

50 years and does drink on rare occasions some alcohol in social settings.

 

ALLERGIES: No known drug allergies.

 

MEDICATIONS ON ADMISSION:

- Tylenol 650 mg every 6 hours for pain or fever

- Wellbutrin  mg daily

- clonidine 0.1 mg per 24-hour patch, changing weekly on Monday, topical agent

- Senokot S one tablet twice a day

- Imdur XR 30 mg daily for hypertension and atherosclerotic cardiovascular

disease

- metoprolol tartrate; I am changing the patient from 100 mg daily to 50 mg 
twice

a day. Metoprolol tartrate is for atherosclerotic cardiovascular disease and

hypertension control.

- simvastatin 20 mg daily for elevated lipids

- Coumadin; patient to receive 2.5 mg nightly in target range of 1.7 to 2.0 for

deep vein thrombosis (DVT) prophylaxis

- oxycodone 5 mg every 6 hours as needed for moderate pain, 10 mg every 6 hours

as needed for severe pain

 

REVIEW OF SYSTEMS: On a 10-point scale is negative except for the hip pain and

difficulties noted above.

 

PHYSICAL EXAMINATION: The patient is a 5 foot tall, 60 kg, 88-year-old white

female who looks stated age and appears to be in mild musculoskeletal distress.

She is well oriented, pleasant and a little hard of hearing.

VITAL SIGNS: Temperature is 98.6, blood pressure 131/62, pulse 84, respirations

18 and pulse oximetry is 96% on room air.

HEENT: Normocephalic, atraumatic using glasses for visual correction. 
Extraocular

motions are intact. Pupils are equal, round, reactive to light and 
accommodation.

Hearing is somewhat decreased. Tongue is midline. No facial asymmetry is noted.

Nares are clear with straight septum.

NECK:  Supple. No carotid bruits appreciated and normal thyroid palpation.

LUNGS: Clear in all fields to auscultation.

CORONARY: Shows a regular rate and rhythm with normal S1 and S2 without S3, S4

murmurs or rubs and 2/4 bilateral radial pulses.

ABDOMEN: Very mildly obese but soft and nontender with no palpable masses and

normal bowel sounds in all quadrants.

EXTREMITIES:  Bilateral upper and left lower extremity with functional range of

motion and no tenderness or difficulty. Distal right lower extremity with good

knee range of motion and ankle range of motion with some guarding around the

right hip and dressings over the surgical incision and screw placement sites 
from

the nail.

NEUROLOGIC: The patient is alert and oriented to person, place, time and

situation. Speech is clear, coherent and appropriate without any dysarthria or

difficulty handling secretions. Affect is slightly anxious but in general

pleasant and cooperative and memory is fair to good. Light touch is intact in

bilateral upper and lower extremities. Vibration is intact in bilateral upper 
and

lower extremities. Tongue is within normal limits in bilateral upper and lower

extremities with 2/4 knee jerks. Biceps, brachial radialis 1/4. Triceps and 
ankle

jerks and downgoing toes on plantar stimulation. Balance appears to be grossly

intact while sitting. The patient was not stood at this time.

 

LABORATORY DATA: Shows elevated white count of 16.7 thousand, which is a

continuation of the leukocytosis previously noted to admission. Hemoglobin and

hematocrit are normal at 13.5 and 43.4 with MCV diminished at 79.9 and platelets

markedly elevated at 626,000, which has also been persistent  thrombocytosis

prior to admission. Neutrophils and monocytes are relatively elevated and

lymphocytes are low, and eosinophils are elevated in number. Electrolytes are

normal. BUN is elevated at 33, creatinine is within normal range at 1.02 with 
GFR

of 54.4 and fasting glucose of 92 this morning. Calcium is reduced at 8.1 
related

to an albumin of 2.4 and total protein of 5.2, which are also reduced. Magnesium

is elevated at 2.8 with normal total and direct bilirubin, mildly low AST, 
normal

ALT and alkaline phosphatase, and normal lipid panel, as well as thyroid panel.

INR is therapeutic at 1.92 today.

 

Postoperative x-ray does show good positioning of the nail and stabilizing 
screws

with alignment of the femoral neck to the femoral shaft.

 

ASSESSMENT AND PLAN:

1. Rehabilitation of right intertrochanteric fracture with open reduction

internal fixation with Synthes TFNA nail: The patient will be started on a trial

of musculoskeletal rehabilitation with physical and occupational therapy to try

to regain her mobility and activities of daily living (ADLs) to allow for return

to the community and her to be able to be self-care. She does have a supportive

family, but she needs to be independent for times when her son and

daughter-in-law are not available. The patient expresses her motivation and

desire to do this and is felt by her prior therapist, as well as the care team 
to

have the potential and ability to participate in acute intensive musculoskeletal

rehabilitation of 3 hours per day. She does have multiple medical problems and

will require rehabilitation nursing, physiatry and medicine consult to review

these. Anticipated length of stay is approximately 7 days.

 

2. Leukocytosis. It is unclear what is causing this. It seems to have persisted

longer than usually seen due to stress, but we will keep an eye on it and a

medicine consultation has been sent to help follow this.

 

3. Thrombocytosis. Again, patient with elevation of platelet count, which seems

to be turning around and heading towards a more normal level. Will continue to

follow this with medicine service.

 

4. Osteoporosis. The patient will continue on treatment and work to rebuild

strength. Will discuss with medicine the possibility of vitamin D and other

medications, even calcium supplementation, to try and help with healing.

 

5. Anxiety/depression. We will continue to observe this and try to provide

appropriate support and continue the patient on Wellbutrin XL.

 

6. Atherosclerotic cardiovascular disease, including hypertension and

hyperlipidemia. Will continue the clonidine 0.1 mg per 24 hour patch along with

isosorbide mononitrate XR 30 mg a day and metoprolol tartrate 50 mg twice a day

and simvastatin 20 mg daily to control lipids.

 

7. Deep vein thrombosis (DVT) prophylaxis. Will go ahead and use thromboembolism

deterrent (SYLVIE) hose plus progress patient's ambulation and try to maintain

appropriate hydration.

 

8. Chronic kidney disease, stage III. Will work with the medicine service on 
this

and to continue to follow regular comprehensive metabolic panels (CMPs) nd basic

metabolic panels (BMPs), just as we will continue with complete blood counts

(CBCs) to follow the leukocytosis and thrombocytosis noted above.

 

9. Pain management. At this time, I will transition the patient away from

Percocet to oxycodone and look to diminish the use of that and consider possible

trial and treatment with tramadol to get away from the more addictive opiate

medications, which may also contribute to diminished bowel function. The patient

is reported to have had bowel movement within the last 2 days, and appropriate

agents such as Fleets enema and Senokot S have been ordered.

 

POST ADMISSION PHYSICIAN EVALUATION: The patient has been consistent with

preadmission screening and does appear to be very motivated to participate in 
and

I believe very capable of benefiting from acute intensive rehabilitation. I am

anticipating there should not be a significant problem with her doing 3 hours of

therapy per day, and I feel she has a good prognosis for being discharged to 
home

with family in approximately 1 week estimated length of stay.

 

Time spent on chart review, history and physical and documentation is greater

than 70 minutes.

ROND

## 2017-10-03 NOTE — DS.PDOC
Discharge Summary


General


Date of Admission


Sep 28, 2017 at 11:21


Date of Discharge


10/3/17


Attending Physician:  DWAINE TITUS MD


Specialist/Consultants Involve:  Joseph Rodney





Discharge Summary


PROCEDURES PERFORMED DURING STAY: Right total hip arthroplasty





ADMITTING/DISCHARGE DIAGNOSES:


1. Mechanical fall with right hip fracture status post right total hip 

arthroplasty


2. Leukocytosis/thrombocytosis/elevated hemoglobin- patient will need 

outpatient hematology/oncology follow-up for further workup


3. Hypertension


4. Hyperlipidemia


5. Chronic kidney disease stage III


6. Osteoporosis


7. Depression/anxiety


8. History of cystocele





COMPLICATIONS/CHIEF COMPLAINT: Hip pain





HISTORY OF PRESENT ILLNESS/HOSPITAL COURSE:


This is a 88-year-old female with past medical history she process/

hyperlipidemia, chronic kidney disease, hypertension who presents with right 

hip pain status post fall. Patient was found to have right hip fracture for 

which she underwent a right hip arthroplasty. Over the course of hospitalization

, the patient's was also noted to have elevated WBC/hemoglobin/platelets, with 

initial workup inconclusive. Patient will need to have outpatient hematology/

oncology workup to exclude malignancy. 





Patient is now hemodynamically stable and ready to be discharged. She will be 

going to [facility] today.





Patient does have a history of osteoporosis and will need close outpatient 

endocrine follow-up, especially in the setting of acute fracture.





DISCHARGE MEDICATIONS: Please see below.





ALLERGIES: Please see below.





PHYSICAL EXAMINATION ON DISCHARGE:


Vitals: (see below) 


General: No acute distress, laying comfortably in bed.


HEENT: Moist mucous membranes.


Neck: No JVD or lymphadenopathy


Cardiac: RRR, No murmurs


Pulm: Clear to auscultation b/l. No wheezing, rhonchi


Abd: NT/ND + BS


Ext: No edema or cyanosis. Right hip with pain with movement. Distal pulses 

intact. No bleeding noted.





LABORATORY DATA: Please see below.





IMAGING: 





PROGNOSIS: Guarded





ACTIVITY: As tolerated.





DIET: Low-sodium





DISCHARGE PLAN/DISPOSITION: PMR





DISCHARGE INSTRUCTIONS:


1. Patient's follow-up with PCP, endocrine, hematology/oncology in 1-2 weeks.





DISCHARGE CONDITION: Stable.





TIME SPENT ON DISCHARGE: Greater than 30 minutes.





Vital Signs/I&Os





Vital Signs








  Date Time  Temp Pulse Resp B/P (MAP) Pulse Ox O2 Delivery O2 Flow Rate FiO2


 


10/3/17 09:41   20     


 


10/3/17 09:10    136/70    


 


10/3/17 09:09  84      


 


10/3/17 06:00 98.6    96 Room Air  


 


9/29/17 06:00       2.0 














I&O- Last 24 Hours up to 6 AM 


 


 10/4/17





 06:00


 


Intake Total 240 ml


 


Balance 240 ml











Laboratory Data


Labs 24H


Laboratory Tests 2


10/3/17 06:28: 


White Blood Count 16.7H, Red Blood Count 5.43H, Hemoglobin 13.5, Hematocrit 43.4

, Mean Corpuscular Volume 79.9L, Mean Corpuscular Hemoglobin 24.9L, Mean 

Corpuscular Hemoglobin Concent 31.1L, Red Cell Distribution Width 13.4, 

Platelet Count 626H, Monocytes # (Auto) , Nucleated Red Blood Cells % (auto) 0.0

, Neutrophils 74, Lymphocytes (Manual) 13L, Monocytes (Manual) 9H, Eosinophils (

Manual) 4, Platelet Estimate INCREASED, Prothrombin Time 22.6H, Prothromb Time 

International Ratio 1.92, Activated Partial Thromboplast Time 63.1H, Anion Gap 

6L, Glomerular Filtration Rate 54.4, Blood Urea Nitrogen 33H, Creatinine 1.02, 

Sodium Level 139, Potassium Level 4.3, Chloride Level 104, Carbon Dioxide Level 

29, Calcium Level 8.1L, Aspartate Amino Transf (AST/SGOT) 10L, Alanine 

Aminotransferase (ALT/SGPT) 13, Alkaline Phosphatase 60, Total Bilirubin 0.6, 

Total Protein 5.2L, Albumin 2.4L, Magnesium Level 2.8H, Albumin/Globulin Ratio 

0.86L


CBC/BMP


Laboratory Tests


10/3/17 06:28








Red Blood Count 5.43 H, Mean Corpuscular Volume 79.9 L, Mean Corpuscular 

Hemoglobin 24.9 L, Mean Corpuscular Hemoglobin Concent 31.1 L, Red Cell 

Distribution Width 13.4, Monocytes # (Auto) , Calcium Level 8.1 L, Aspartate 

Amino Transf (AST/SGOT) 10 L, Alanine Aminotransferase (ALT/SGPT) 13, Alkaline 

Phosphatase 60, Total Bilirubin 0.6, Total Protein 5.2 L, Albumin 2.4 L





Discharge Medications


Scheduled


 (Senna Plus 8.6-50 mg) 1 Tab Tab, 1 TAB PO BID


Bupropion Hcl (Bupropion HCl Xl) 150 Mg Tab, 150 MG PO DAILY, (Reported)


Calcium Citrate (Calcitrate) 950 Mg Tab, 950 MG PO QMONTH, (Reported)


Clonidine Hydrochloride (Clonidine HCl) 0.1 Mg/24 Hr Dis, 0.1 MG TD QWEEK, (

Reported)


   MONDAYS-CURRENTLY ON RIGHT ARM 


Isosorbide Mononitrate (Isosorbide Mononitrate ER) 30 Mg Tab, 30 MG PO DAILY, (

Reported)


Metoprolol Tartrate (Metoprolol Tartrate) 50 Mg Tab, 50 MG PO BID


Polyethylene Glycol (Peg 3350) 1 Pkt Pow, 1 PKT PO DAILY


Simvastatin (Simvastatin) 20 Mg Tab, 20 MG PO DAILY, (Reported)


Vitamin D (Drisdol) 50,000 Unit Cap, 50,000 UNIT PO QMONTH, (Reported)





Scheduled PRN


Acetaminophen (Mapap) 325 Mg Tab, 650 MG PO Q4HP PRN for TEMP >101


Ondansetron HCl (Ondansetron HCl) 4 Mg Tab, 4 MG PO Q4HP PRN for NAUSEA OR 

VOMITING


Oxycodone/Acetaminophen (Percocet 5MG/325MG Tablet) 1 Tab Tab, 2 TAB PO Q4HP 

PRN for SEVERE PAIN (PS 8-10)


Sodium Phosphate/Biphosphate (Fleet Enema 7-19 gm/118Ml) 1 Gisele Gisele, 0 EA MI 

DAILYPRN PRN for CONSTIPATION





Allergies


Coded Allergies:  


     No Known Allergies (Unverified , 5/10/17)











DWAINE TITUS MD Oct 3, 2017 15:48

## 2017-10-04 NOTE — CR.PDOC
[facility] Consultation


Consultation


CONSULTATION REPORT FOR: Dr Yeh


REASON FOR CONSULTATION: Medical Management


DATE OF VISIT: 10/4/17


ATTENDING: Dr. Mich Whitley








HPI: Patient is an 88 year old  female with a PMHx of HTN, DLP, CKD3, 

Osteoporosis, Vitamin D deficiency, Depression, Anxiety and Hx of a Cystocele 

who presented to the ER 9/28/17 with complaints of right hip pain after she had 

mechanical fall at home. She was found to have a right hip fracture, orthopedic 

surgery completed Rt hip ORIF on 9/28/17.


Pt is transferred to the care of DENISA Matthews, 10/3/17. 


Pt feels tired however no other complaints today. 


 Denies any fevers, chills, weakness, fatigue, Headache, Chest Pain, Shortness 

of breath, cough, palpitations, abdominal pain, N/V/D or changes in bowel or 

bladder habits.





PMHx: 


HTN


HLD


CKD3


osteoporosis


Vitamin D Def








PSHX:


Hysterectomy and bilateral salpingo-opherectomy (1993)


Bilateral cataract surgery (2007)


Left humerus fracture  s/p ORIF (7/2017)


Cystocele (not corrected)











SOCHX:


Denies the use of illicit drugs; Quit smoking 50 years prior, smoker of 20 

years at 1 ppd, Social alcohol use 


Denies recent travel or sick contacts


Lives with son and daughter in law


Occupation; Retired from school cafeteria








ROS:


As noted in HPI, otherwise 11pt ROS of systems reviewed and unremarkable.


                 


PE:      


GEN:  88yoF, appears stated age. Well-nourished, well developed. No acute 

distress. Alert and oriented x 3. Pleasant, interactive. 


HEENT: Normocephalic, atraumatic.  Sclera are nonicteric. Conjunctiva without 

injection. Nose midline. Nasal turbinates without bogginess. No facial 

asymmetry. Moist mucous membranes. Dentition fair. Pharynx pink and moist, no 

cobblestoning. Neck supple, trachea midline. No lymphadenopathy or thyromegaly 

appreciated. 


CHEST: Regular rate and rhythm, +S1, +S2


LUNGS: Clear to auscultation bilaterally. No wheezes, rales, or rhonchi. 

Breathing appears symmetric and easy. Patient is speaking in full sentences. No 

accessory muscle use.


ABD: Round, soft, non-tender, non-distended. +Bowel sounds throughout. No 

rebound or guarding. No costovertebral angle tenderness.


EXT: Pulses 2+ bilaterally dorsalis pedis and radial. No lower extremity edema 

appreciated.


SKIN: Pink, dry, warm. Capillary refill <2sec. No rashes.


NEURO: Alert and oriented x 3. Cranial nerves III-XII are intact. No focal 

deficits appreciated. 





Peripheral smear 9/29/17


Leukocytosis with neutrophilia, suggestive of a reactive/inflammatory process.


No blasts are noted.


RBCs and platelets are within normal range.


Follow up with CBC, diff is recommended to ensure the white count normalizes





A&P:  Patient is an 88 year old  female with a PMHx of HTN, DLP, CKD3, 

Osteoporosis, Vitamin D deficiency, Depression, Anxiety and Hx of a Cystocele 

who presented to the ER 9/28/17 with complaints of right hip pain after she had 

mechanical fall at home. She was found to have a right hip fracture, orthopedic 

surgery completed Rt hip ORIF on 9/28/17.


Pt is transferred to the care of DENISA Matthews, 10/3/17. 


1. Mechancal Fall/Right hip fracture at femoral neck/s/p ORIF as per Orthopedics

(9/28/17)


Mgmt as per DENISA Yeh/Orthopedics. 


Pain control as per Dr Rao CRAWLEY. 


Bowel care as per DENISA/Dr Yeh. 


PT/OT as per DENISA/Dr Yeh.


DVT px as per Orthopedics. Pt on Coumadin. 


2. Elevated WBC / Elevated Platelet Count. 


Labs compared to 7/2017 appear consistent with current labs


Afebrile and review of systems remains negative


UA unremarkable 10/4/17. 


Peripheral smear appears to have a reactive process; no evidence of blast cells


Will continue to follow CBC trend 


3. Elevated PTT


HAs been trending downward


Corrected with mixing study; indicating possible deficiency


Factor VIII / IX level, Factor 8 function, vWF and Ristocetin Cofactor test 

remain pending 


No evidence of bleeding or thrombosis 


monitor. 


4. HTN


BP remains well controlled 


c/w Clonidine patch and Metoprolol


Will hold Irbesartan and HCTZ


5. DLP


c/w Simvastatin 


6. Re-Elevated Cr on CKD3


Resolved. 


ARB/HCTZ on hold. 


Cr baseline appears to be between 1.2 and 1.3


7. Osteoporosis


Will need to f/u outpatient with PCP 


8. Vitamin D deficiency


Will hold supplementation 


9. Depression / Anxiety


c/w Bupropion 


10. Hx of a Cystocele


Thank you for your consultation. We will continue to follow along with you.





Vital Signs/I&O





Vital Signs








  Date Time  Temp Pulse Resp B/P (MAP) Pulse Ox O2 Delivery O2 Flow Rate FiO2


 


10/4/17 10:14    125/60    


 


10/4/17 09:33  70      


 


10/4/17 06:00 99.7  18  94 Room Air  











Laboratory Data


Labs 24H


Laboratory Tests 2


10/4/17 02:36: 


Urine Appearance CLEAR, Urine Color YELLOW, Urine pH 7.0, Urine Specific 

Gravity 1.011, Urine Protein NEGATIVE, Urine Glucose (UA) NEGATIVE, Urine 

Ketones NEGATIVE, Urine Urobilinogen 0.2, Urine Bilirubin NEGATIVE, Urine 

Leukocyte Esterase NEGATIVE, Urine Blood NEGATIVE, Urine Nitrite NEGATIVE, 

Urine WBC (Auto) 0, Urine RBC (Auto) 2, Urine Hyaline Casts (Auto) 0, Urine 

Bacteria (Auto) 1+H, Urine Squamous Epithelial Cells 0, Urine Sperm (Auto) 


10/4/17 06:22: 


Immature Granulocyte % (Auto) 1.2H, White Blood Count 18.3H, Red Blood Count 

5.52H, Hemoglobin 13.7, Hematocrit 43.9, Mean Corpuscular Volume 79.5L, Mean 

Corpuscular Hemoglobin 24.8L, Mean Corpuscular Hemoglobin Concent 31.2L, Red 

Cell Distribution Width 13.2, Platelet Count 628H, Neutrophils (%) (Auto) 75.2H

, Lymphocytes (%) (Auto) 6.4L, Monocytes (%) (Auto) 12.3H, Eosinophils (%) (Auto

) 4.0H, Basophils (%) (Auto) 0.9, Neutrophils # (Auto) 13.8H, Lymphocytes # (

Auto) 1.2L, Monocytes # (Auto) 2.3H, Eosinophils # (Auto) 0.7H, Basophils # (

Auto) 0.2, Immature Granulocyte # (Auto) 0.2H, Nucleated Red Blood Cells % (auto

) 0.0, Prothrombin Time 21.4H, Prothromb Time International Ratio 1.80, 

Activated Partial Thromboplast Time 53.1H, Anion Gap 6L, Glomerular Filtration 

Rate > 60.0, Blood Urea Nitrogen 24H, Creatinine 0.93, Sodium Level 138, 

Potassium Level 4.3, Chloride Level 104, Carbon Dioxide Level 28, Calcium Level 

8.6L, Aspartate Amino Transf (AST/SGOT) 14L, Alanine Aminotransferase (ALT/SGPT

) 11L, Alkaline Phosphatase 61, Total Bilirubin 0.6, Total Protein 5.2L, 

Albumin 2.4L, Magnesium Level 2.3, Albumin/Globulin Ratio 0.86L


CBC/BMP


Laboratory Tests


10/4/17 06:22








Red Blood Count 5.52 H, Mean Corpuscular Volume 79.5 L, Mean Corpuscular 

Hemoglobin 24.8 L, Mean Corpuscular Hemoglobin Concent 31.2 L, Red Cell 

Distribution Width 13.2, Neutrophils (%) (Auto) 75.2 H, Lymphocytes (%) (Auto) 

6.4 L, Monocytes (%) (Auto) 12.3 H, Eosinophils (%) (Auto) 4.0 H, Basophils (%) 

(Auto) 0.9, Neutrophils # (Auto) 13.8 H, Lymphocytes # (Auto) 1.2 L, Monocytes 

# (Auto) 2.3 H, Eosinophils # (Auto) 0.7 H, Basophils # (Auto) 0.2, Calcium 

Level 8.6 L, Aspartate Amino Transf (AST/SGOT) 14 L, Alanine Aminotransferase (

ALT/SGPT) 11 L, Alkaline Phosphatase 61, Total Bilirubin 0.6, Total Protein 5.2 

L, Albumin 2.4 L





Allergies


Coded Allergies:  


     No Known Allergies (Unverified , 5/10/17)





Home Medications


Scheduled


 (Senna Plus 8.6-50 mg) 1 Tab Tab, 1 TAB PO BID for 30 Days


Bupropion Hcl (Bupropion HCl Xl) 150 Mg Tab, 150 MG PO DAILY, (Reported)


Calcium Citrate (Calcitrate) 950 Mg Tab, 950 MG PO QMONTH, (Reported)


Clonidine Hydrochloride (Clonidine HCl) 0.1 Mg/24 Hr Dis, 0.1 MG TD QWEEK, (

Reported)


   MONDAYS-CURRENTLY ON RIGHT ARM 


Isosorbide Mononitrate (Isosorbide Mononitrate ER) 30 Mg Tab, 30 MG PO DAILY, (

Reported)


Metoprolol Tartrate (Metoprolol Tartrate) 50 Mg Tab, 50 MG PO BID, #30


Polyethylene Glycol (Peg 3350) 1 Pkt Pow, 1 PKT PO DAILY for 30 Days


Simvastatin (Simvastatin) 20 Mg Tab, 20 MG PO DAILY, (Reported)


Vitamin D (Drisdol) 50,000 Unit Cap, 50,000 UNIT PO QMONTH, (Reported)





Scheduled PRN


Acetaminophen (Mapap) 325 Mg Tab, 650 MG PO Q4HP PRN for TEMP >101 for 30 Days


Ondansetron HCl (Ondansetron HCl) 4 Mg Tab, 4 MG PO Q4HP PRN for NAUSEA OR 

VOMITING for 30 Days


Oxycodone/Acetaminophen (Percocet 5MG/325MG Tablet) 1 Tab Tab, 2 TAB PO Q4HP 

PRN for SEVERE PAIN (PS 8-10) for 30 Days


Sodium Phosphate/Biphosphate (Fleet Enema 7-19 gm/118Ml) 1 Gisele Gisele, 0 EA AK 

DAILYPRN PRN for CONSTIPATION for 30 Days











Pattie Aguilar Oct 4, 2017 12:36

## 2017-10-04 NOTE — IPNPDOC
Physiatrist Progress Note


DATE OF SERVICE:  10/4/17





DATE OF ADMISSION: Oct 3, 2017 at 13:40 


INPATIENT REHABILITATION ADMISSION DAY: #2





SUBJECTIVE: Patient is a 88-year-old white female with right hip fracture 

status post ORIF with Synthes TFNA nail. On 9/28/17 patient was out walking her 

doctor and tripped over backwards onto her right buttocks and developed pain in 

the right thigh was unable to get up she called her daughter-in-law, who was 

also unable to get her up. Her son was summoned and was able to bear up into 

the car and take her to the emergency room at [facility]. There 

was discovered the patient had sustained a right intertrochanteric fracture of 

the hip. Later that day patient had the open reduction internal fixation. She 

has since been working with physical and occupational therapy and progress and 

felt to be appropriate for acute intensive muscle skeletal rehabilitation and 

was admitted on 10/3/17. Patient notes that the hip pain is improving and 

expresses her motivation participate in therapy so she may return to living at 

home. She is without other complaints today.





ALLERGIES: See Below





MEDICATIONS: Reviewed, see below.





OBJECTIVE:


VITAL SIGNS: Please see below.


PHYSICAL EXAMINATION:


GENERAL: Well-nourished, well-developed, short, elderly, white female sitting 

up in manual wheelchair in very mild musculoskeletal distress, who is alert and 

well oriented.


HEENT: Normocephalic/atraumatic, wearing glasses for visual correction.


CARDIOVASCULAR: Regular rate and rhythm with normal S1, S2 without S3-S4 

murmurs or rubs. 2 out 4 bilateral radial pulses.


LUNGS: All fields are clear to auscultation.


ABDOMEN: Bowel sounds present in all quadrants.


NEUROLOGICAL: Patient is alert and oriented 4. Speech is clear coherent and 

appropriate. Very mild anxiousness but overall is pleasant and cooperative. 

Memory is grossly intact. Motor shows good to full bilateral upper extremities 

and left lower extremity with good right lower extremity except for some 

guarding at the hip.


SKIN: Patient with opti-foam dressings over right thigh nail and screw in 

surgeon sites.





LABORATORY DATA: Reviewed. Please see below.





MICROBIOLOGY: Please see below.





IMAGING: No new imaging today.





DVT prophylaxis ordered?: Coumadin with INR today 1.80. Patient also using 

sequential compression stockings and SYLVIE hose.





ASSESSMENT AND PLAN:


1. Rehabilitation of right fracture with ORIF on 9/28/17: Patient started in 

physical and occupational therapy. Patient when seen early this morning had 

already ambulated approximately 70 feet with a front wheel walker in PT and had 

been up and dressed and done morning bathroom activities with occupational 

therapy. Patient showing very good motivation and significant daily change. I 

continue to anticipate her total length of stay to be 7 days. We will discuss 

this further in team rounds tomorrow.





2. Leukocytosis: WBC count is 18.3 thousand today. It is unclear why it 

continues to be this elevated and a patient without signs of infection whose 

level of stress should need diminishing. I will continue to work with medicine 

service on appropriate evaluation.





3. Thrombocythemia: Patient remains notably elevated at 628,000 platelets. I 

will continue to watch this with medicine service, but no specific treatment at 

this time.





4. DVT prophylaxis: Patient therapeutic on Coumadin and using sequential 

compression stockings and SYLVIE hose. Ms. Johnson of orthopedics service will make 

medication adjustments. Target range is 1.7-2.0 on the INR.





5. Acute on chronic kidney disease: Creatinine normal today at 0.93 with mild 

elevation of BUN at 24. At this time no changes in treatment are anticipated. I 

will defer to medicine consult on a adaptions or fluid adjustments for this 

patient.





TIME SPENT: Chart Review, examination and documentation require greater than 25 

minutes.


Allergies


Coded Allergies:  


     No Known Allergies (Unverified , 5/10/17)





Vital Signs





Vital Signs








  Date Time  Temp Pulse Resp B/P (MAP) Pulse Ox O2 Delivery O2 Flow Rate FiO2


 


10/4/17 10:14    125/60    


 


10/4/17 09:33  70      


 


10/4/17 06:00 99.7  18  94 Room Air  











Laboratory Data


CBC/BMP


Laboratory Tests


10/4/17 06:22








Red Blood Count 5.52 H, Mean Corpuscular Volume 79.5 L, Mean Corpuscular 

Hemoglobin 24.8 L, Mean Corpuscular Hemoglobin Concent 31.2 L, Red Cell 

Distribution Width 13.2, Neutrophils (%) (Auto) 75.2 H, Lymphocytes (%) (Auto) 

6.4 L, Monocytes (%) (Auto) 12.3 H, Eosinophils (%) (Auto) 4.0 H, Basophils (%) 

(Auto) 0.9, Neutrophils # (Auto) 13.8 H, Lymphocytes # (Auto) 1.2 L, Monocytes 

# (Auto) 2.3 H, Eosinophils # (Auto) 0.7 H, Basophils # (Auto) 0.2, Calcium 

Level 8.6 L, Aspartate Amino Transf (AST/SGOT) 14 L, Alanine Aminotransferase (

ALT/SGPT) 11 L, Alkaline Phosphatase 61, Total Bilirubin 0.6, Total Protein 5.2 

L, Albumin 2.4 L


Labs 24H


Laboratory Tests 2


10/4/17 02:36: 


Urine Appearance CLEAR, Urine Color YELLOW, Urine pH 7.0, Urine Specific 

Gravity 1.011, Urine Protein NEGATIVE, Urine Glucose (UA) NEGATIVE, Urine 

Ketones NEGATIVE, Urine Urobilinogen 0.2, Urine Bilirubin NEGATIVE, Urine 

Leukocyte Esterase NEGATIVE, Urine Blood NEGATIVE, Urine Nitrite NEGATIVE, 

Urine WBC (Auto) 0, Urine RBC (Auto) 2, Urine Hyaline Casts (Auto) 0, Urine 

Bacteria (Auto) 1+H, Urine Squamous Epithelial Cells 0, Urine Sperm (Auto) 


10/4/17 06:22: 


Immature Granulocyte % (Auto) 1.2H, White Blood Count 18.3H, Red Blood Count 

5.52H, Hemoglobin 13.7, Hematocrit 43.9, Mean Corpuscular Volume 79.5L, Mean 

Corpuscular Hemoglobin 24.8L, Mean Corpuscular Hemoglobin Concent 31.2L, Red 

Cell Distribution Width 13.2, Platelet Count 628H, Neutrophils (%) (Auto) 75.2H

, Lymphocytes (%) (Auto) 6.4L, Monocytes (%) (Auto) 12.3H, Eosinophils (%) (Auto

) 4.0H, Basophils (%) (Auto) 0.9, Neutrophils # (Auto) 13.8H, Lymphocytes # (

Auto) 1.2L, Monocytes # (Auto) 2.3H, Eosinophils # (Auto) 0.7H, Basophils # (

Auto) 0.2, Immature Granulocyte # (Auto) 0.2H, Nucleated Red Blood Cells % (auto

) 0.0, Prothrombin Time 21.4H, Prothromb Time International Ratio 1.80, 

Activated Partial Thromboplast Time 53.1H, Anion Gap 6L, Glomerular Filtration 

Rate > 60.0, Blood Urea Nitrogen 24H, Creatinine 0.93, Sodium Level 138, 

Potassium Level 4.3, Chloride Level 104, Carbon Dioxide Level 28, Calcium Level 

8.6L, Aspartate Amino Transf (AST/SGOT) 14L, Alanine Aminotransferase (ALT/SGPT

) 11L, Alkaline Phosphatase 61, Total Bilirubin 0.6, Total Protein 5.2L, 

Albumin 2.4L, Magnesium Level 2.3, Albumin/Globulin Ratio 0.86L





Current Medications


Current Medications





Current Medications


Acetaminophen (Tylenol Tab) 650 mg Q6HP  PRN PO PAIN OR FEVER;  Start 10/3/17 

at 09:45;  Stop 11/2/17 at 09:44


Bupropion HCl (Wellbutrin Xl) 150 mg DAILY PO  Last administered on 10/4/17at 09

:33;  Start 10/4/17 at 09:00;  Stop 11/3/17 at 08:59


Clonidine HCl (Catapres-Tts-1) 1 ea Mo@09 TOP ;  Start 10/9/17 at 09:00;  Stop 

11/8/17 at 08:59


Isosorbide Mononitrate (Imdur) 30 mg DAILY PO  Last administered on 10/4/17at 10

:14;  Start 10/4/17 at 09:00;  Stop 11/3/17 at 08:59


Metoprolol Tartrate (Lopressor) 50 mg BID PO  Last administered on 10/4/17at 09:

33;  Start 10/4/17 at 09:00;  Stop 11/3/17 at 08:59


Oxycodone HCl (Roxicodone, Oxyir) 5 mg Q6HP  PRN PO MODERATE PAIN (PS 5-7);  

Start 10/3/17 at 09:45;  Stop 10/10/17 at 09:44


Oxycodone HCl (Roxicodone, Oxyir) 10 mg Q6HP  PRN PO SEVERE PAIN (PS 8-10);  

Start 10/3/17 at 09:45;  Stop 10/10/17 at 09:44


Senna/Docusate Sodium (Senokot S) 1 tab BID PO  Last administered on 10/4/17at 

09:33;  Start 10/3/17 at 21:00;  Stop 11/2/17 at 20:59


Simvastatin (Zocor) 20 mg DAILY PO  Last administered on 10/4/17at 09:33;  

Start 10/4/17 at 09:00;  Stop 11/3/17 at 08:59


Sodium Biphosphate/ Sodium Phosphate (Fleet Enema) 1 ea DAILYPRN  PRN [place] 

CONSTIPATION;  Start 10/3/17 at 09:45;  Stop 11/2/17 at 09:44











SWETA SCHULER MD Oct 4, 2017 13:05

## 2017-10-05 NOTE — IPNPDOC
Physiatrist Progress Note


DATE OF SERVICE:  10/5/17





DATE OF ADMISSION: Oct 3, 2017 at 13:40 


INPATIENT REHABILITATION ADMISSION DAY: #3 





SUBJECTIVE: Patient is a 88-year-old white female with right hip fracture 

status post ORIF with Synthes TFNA nail. On 9/28/17 patient was out walking her 

doctor and tripped over backwards onto her right buttocks and developed pain in 

the right thigh was unable to get up she called her daughter-in-law, who was 

also unable to get her up. Her son was summoned and was able to bear up into 

the car and take her to the emergency room at [facility]. There 

was discovered the patient had sustained a right intertrochanteric fracture of 

the hip. Later that day patient had the open reduction internal fixation. She 

has since been working with physical and occupational therapy and progress and 

felt to be appropriate for acute intensive muscle skeletal rehabilitation and 

was admitted on 10/3/17. Patient notes that the hip pain is improving and 

expresses her motivation participate in therapy so she may return to living at 

home. She is without other complaints today.





ALLERGIES: See Below





MEDICATIONS: Reviewed, see below.





OBJECTIVE:


VITAL SIGNS: Please see below.


PHYSICAL EXAMINATION:


GENERAL: Well-nourished, well-developed, short, elderly, white female sitting 

up in manual wheelchair in very mild musculoskeletal distress, who is alert and 

well oriented.


HEENT: Normocephalic/atraumatic, wearing glasses for visual correction.


CARDIOVASCULAR: Regular rate and rhythm with normal S1, S2 without S3-S4 

murmurs or rubs. 2 out 4 bilateral radial pulses.


LUNGS: All fields are clear to auscultation.


ABDOMEN: Bowel sounds present in all quadrants.


NEUROLOGICAL: Patient is alert and oriented 4. Speech is clear coherent and 

appropriate. Pleasant and cooperative. Memory is grossly intact. Motor shows 

good to full bilateral upper extremities and left lower extremity with good 

right lower extremity except for some guarding at the hip.


SKIN: Patient with opti-foam dressings over right thigh nail and screw in 

surgeon sites.





LABORATORY DATA: Reviewed. Please see below.





MICROBIOLOGY: Please see below.





IMAGING: No new imaging today.





DVT prophylaxis ordered?: Coumadin with INR today 2.03. Patient also using 

sequential compression stockings and SYLVIE hose.





ASSESSMENT AND PLAN:


1. Rehabilitation of right fracture with ORIF on 9/28/17: Patient started in 

physical and occupational therapy. Patient when seen early this morning had 

already ambulated approximately 70 feet with a front wheel walker in PT and had 

been up and dressed and done morning bathroom activities with occupational 

therapy. Patient showing very good motivation and significant daily change. I 

continue to anticipate her total length of stay to be 7 days. 





REHAB. TEAM ROUNDS: Patient with good effort and for age good endurance. She is 

currently Supervision to [facility] in most ADL's and mobility with good pain 

tolerance. Patient is expected to achieve Discharge goal for home by 10/9/17 as 

she does need to be Independent to Modified Independent in ADL's & mobility to 

function at home, where she is alone frequently.





2. Leukocytosis: WBC count is 18.3 thousand on 10/4/17. It is unclear why it 

continues to be this elevated and a patient without signs of infection whose 

level of stress should need diminishing. I will continue to work with medicine 

service on appropriate evaluation.





3. Thrombocythemia: Patient remains notably elevated at 628,000 platelets. I 

will continue to watch this with medicine service, but no specific treatment at 

this time.





4. DVT prophylaxis: Patient therapeutic on Coumadin and using sequential 

compression stockings and SYLVIE hose. Ms. Johnson of orthopedics service will make 

medication adjustments. Target range is 1.7-2.0 on the INR.





5. Acute on chronic kidney disease: Creatinine normal at 0.93 with mild 

elevation of BUN at 24. At this time no changes in treatment are anticipated. I 

will defer to medicine consult on a adaptions or fluid adjustments for this 

patient. Mg++ stable at 2.3 in normal range.





TIME SPENT: Chart Review, examination and documentation require greater than 25 

minutes.


Allergies


Coded Allergies:  


     No Known Allergies (Unverified , 5/10/17)





Vital Signs





Vital Signs








  Date Time  Temp Pulse Resp B/P (MAP) Pulse Ox O2 Delivery O2 Flow Rate FiO2


 


10/5/17 08:28  61  122/60    


 


10/5/17 06:00 98.5  16  96 Room Air  











Laboratory Data


Labs 24H


Laboratory Tests 2


10/5/17 06:46: 


Prothrombin Time 23.6H, Prothromb Time International Ratio 2.03, Magnesium 

Level 2.3





Current Medications


Current Medications





Current Medications


Acetaminophen (Tylenol Tab) 650 mg Q6HP  PRN PO PAIN OR FEVER;  Start 10/3/17 

at 09:45;  Stop 11/2/17 at 09:44


Bupropion HCl (Wellbutrin Xl) 150 mg DAILY PO  Last administered on 10/5/17at 08

:26;  Start 10/4/17 at 09:00;  Stop 11/3/17 at 08:59


Clonidine HCl (Catapres-Tts-1) 1 ea Mo@09 TOP ;  Start 10/9/17 at 09:00;  Stop 

11/8/17 at 08:59


Isosorbide Mononitrate (Imdur) 30 mg DAILY PO  Last administered on 10/5/17at 08

:27;  Start 10/4/17 at 09:00;  Stop 11/3/17 at 08:59


Metoprolol Tartrate (Lopressor) 50 mg BID PO  Last administered on 10/5/17at 08:

28;  Start 10/4/17 at 09:00;  Stop 11/3/17 at 08:59


Oxycodone HCl (Roxicodone, Oxyir) 5 mg Q6HP  PRN PO MODERATE PAIN (PS 5-7);  

Start 10/3/17 at 09:45;  Stop 10/10/17 at 09:44


Oxycodone HCl (Roxicodone, Oxyir) 10 mg Q6HP  PRN PO SEVERE PAIN (PS 8-10);  

Start 10/3/17 at 09:45;  Stop 10/10/17 at 09:44


Senna/Docusate Sodium (Senokot S) 1 tab BID PO  Last administered on 10/4/17at 

09:33;  Start 10/3/17 at 21:00;  Stop 11/2/17 at 20:59


Simvastatin (Zocor) 20 mg DAILY PO  Last administered on 10/5/17at 08:27;  

Start 10/4/17 at 09:00;  Stop 11/3/17 at 08:59


Sodium Biphosphate/ Sodium Phosphate (Fleet Enema) 1 ea DAILYPRN  PRN [place] 

CONSTIPATION;  Start 10/3/17 at 09:45;  Stop 11/2/17 at 09:44











SWETA SCHULER MD Oct 5, 2017 10:29

## 2017-10-06 NOTE — IPNPDOC
Physiatrist Progress Note


DATE OF SERVICE:  10/6/17





DATE OF ADMISSION: Oct 3, 2017 at 13:40 


INPATIENT REHABILITATION ADMISSION DAY: #4 





SUBJECTIVE: Patient is a 88-year-old white female with right hip fracture 

status post ORIF with Synthes TFNA nail. On 9/28/17 patient was out walking her 

doctor and tripped over backwards onto her right buttocks and developed pain in 

the right thigh was unable to get up she called her daughter-in-law, who was 

also unable to get her up. Her son was summoned and was able to bear up into 

the car and take her to the emergency room at [facility]. There 

was discovered the patient had sustained a right intertrochanteric fracture of 

the hip. Later that day patient had the open reduction internal fixation.  

Patient notes that the hip pain is improving and expresses her motivation 

participate in therapy so she may return to living at home. She is not needing 

any opiates at this time for pain control. She is without other complaints 

today.





ALLERGIES: See Below





MEDICATIONS: Reviewed, see below.





OBJECTIVE:


VITAL SIGNS: Please see below.


PHYSICAL EXAMINATION:


GENERAL: Well-nourished, well-developed, short, elderly, white female sitting 

up in manual wheelchair in very mild musculoskeletal distress, who is alert and 

well oriented.


HEENT: Normocephalic/atraumatic, wearing glasses for visual correction.


CARDIOVASCULAR: Regular rate and rhythm with normal S1, S2 without S3-S4 

murmurs or rubs. 2 out 4 bilateral radial pulses.


LUNGS: All fields are clear to auscultation.


ABDOMEN: Bowel sounds present in all quadrants.


NEUROLOGICAL: Patient is alert and oriented 4. Speech is clear coherent and 

appropriate. Pleasant and cooperative. Memory is grossly intact. Motor shows 

good to full bilateral upper extremities and left lower extremity with good 

right lower extremity except for some guarding at the hip.


SKIN: Patient with opti-foam dressings over right thigh nail and screw in 

surgeon sites.





LABORATORY DATA: Reviewed. Please see below.





MICROBIOLOGY: Please see below.





IMAGING: No new imaging today.





DVT prophylaxis ordered?: Coumadin with INR today 2.18. Patient also using 

sequential compression stockings and SYLVIE hose.





ASSESSMENT AND PLAN:


1. Rehabilitation of right fracture with ORIF on 9/28/17: Patient with good 

effort and for age good endurance. She is currently Supervision to CGA in most 

ADL's and mobility with good pain tolerance. Patient is expected to achieve 

Discharge goal for home by 10/9/17 as she does need to be Independent to 

Modified Independent in ADL's & mobility to function at home, where she is 

alone frequently.





2. Leukocytosis: WBC count is 18.3 thousand on 10/4/17 and 17.5K today 10/6/17. 

It appears to be stress related and now improving. I will continue to work with 

medicine service.





3. Thrombocythemia: Patient remains notably elevated at 607,000 platelets and 

trending down. I will continue to watch this with medicine service, but no 

specific treatment at this time.





4. DVT prophylaxis: Patient therapeutic on Coumadin and using sequential 

compression stockings and SYLVIE hose. Ms. Johnson of orthopedics service will make 

medication adjustments. Target range is 1.7-2.0 on the INR. No Coumadin for 

tonight





5. Acute on chronic kidney disease: Creatinine normal at 0.99 with elevation 

higher of BUN at 31. At this time no changes in treatment are anticipated. I 

will defer to medicine consult on a adaptions or fluid adjustments for this 

patient.





TIME SPENT: Chart Review, examination and documentation require greater than 25 

minutes.


Allergies


Coded Allergies:  


     No Known Allergies (Unverified , 5/10/17)





Vital Signs





Vital Signs








  Date Time  Temp Pulse Resp B/P (MAP) Pulse Ox O2 Delivery O2 Flow Rate FiO2


 


10/6/17 08:58    132/62    


 


10/6/17 08:57  66      


 


10/6/17 08:00      Room Air  


 


10/6/17 06:00 97.8  18  96   











Laboratory Data


CBC/BMP


Laboratory Tests


10/6/17 06:55








Red Blood Count 5.52 H, Mean Corpuscular Volume 79.9 L, Mean Corpuscular 

Hemoglobin 24.6 L, Mean Corpuscular Hemoglobin Concent 30.8 L, Red Cell 

Distribution Width 13.2, Calcium Level 8.0 L


Labs 24H


Laboratory Tests 2


10/6/17 06:55: 


Prothrombin Time 25.1H, Prothromb Time International Ratio 2.18, Anion Gap 4L, 

Glomerular Filtration Rate 56.4, Blood Urea Nitrogen 23H, Creatinine 0.99, 

Sodium Level 142, Potassium Level 4.4, Chloride Level 108H, Carbon Dioxide 

Level 30, Calcium Level 8.0L





Current Medications


Current Medications





Current Medications


Acetaminophen (Tylenol Tab) 650 mg Q6HP  PRN PO PAIN OR FEVER Last administered 

on 10/5/17at 21:17;  Start 10/3/17 at 09:45;  Stop 11/2/17 at 09:44


Bupropion HCl (Wellbutrin Xl) 150 mg DAILY PO  Last administered on 10/6/17at 08

:57;  Start 10/4/17 at 09:00;  Stop 11/3/17 at 08:59


Clonidine HCl (Catapres-Tts-1) 1 ea Mo@09 TOP ;  Start 10/9/17 at 09:00;  Stop 

11/8/17 at 08:59


Isosorbide Mononitrate (Imdur) 30 mg DAILY PO  Last administered on 10/6/17at 08

:58;  Start 10/4/17 at 09:00;  Stop 11/3/17 at 08:59


Metoprolol Tartrate (Lopressor) 50 mg BID PO  Last administered on 10/6/17at 08:

57;  Start 10/4/17 at 09:00;  Stop 11/3/17 at 08:59


Oxycodone HCl (Roxicodone, Oxyir) 5 mg Q6HP  PRN PO SEVERE PAIN (PS 8-10);  

Start 10/3/17 at 09:45;  Stop 10/12/17 at 23:55


Oxycodone HCl (Roxicodone, Oxyir) 10 mg Q6HP  PRN PO SEVERE PAIN (PS 8-10);  

Start 10/3/17 at 09:45;  Stop 10/12/17 at 23:55;  Status Cancel


Senna/Docusate Sodium (Senokot S) 1 tab BID PO  Last administered on 10/6/17at 

08:57;  Start 10/3/17 at 21:00;  Stop 11/2/17 at 20:59


Simvastatin (Zocor) 20 mg DAILY PO  Last administered on 10/6/17at 08:57;  

Start 10/4/17 at 09:00;  Stop 11/3/17 at 08:59


Sodium Biphosphate/ Sodium Phosphate (Fleet Enema) 1 ea DAILYPRN  PRN MA 

CONSTIPATION;  Start 10/3/17 at 09:45;  Stop 11/2/17 at 09:44











SWETA SCHULER MD Oct 6, 2017 12:53

## 2017-10-10 NOTE — PMRDS
DATE OF ADMISSION:  10/03/2017

DATE OF DISCHARGE:

 

DISCHARGE DIAGNOSIS:  Rehabilitation of right hip fracture status post open

reduction internal fixation with Synthes TFNA nail on 09/28/2017, following

fracture earlier that day.

 

HISTORY:  Patient is an 88-year-old white female who has been fairly active and

was out walking her dog.  She reports tripping and falling backwards onto her

buttocks, developing right hip pain and not being able to get up nor was her

daughter-in-law able to help her get up.  Her son was able to get her up and

brought her to [facility], where she was found to have sustained a

right intertrochanteric hip fracture.  The patient, of note, had a left humeral

fracture from a fall in July of 2017, which required open reduction internal

fixation and having some problems with the left shoulder mobility and strength

since then.  However, she has been living independently as she is right hand

dominant and has been doing fairly well other than with her left shoulder and was

working with occupational therapy and physical therapy on overcoming the humeral

fracture.  Patient was started in physical therapy (PT) and occupational therapy

(OT) and was progressing and felt to be able to participate in and benefit from

acute intensive rehabilitation.  Notable for having leukocytosis and

thrombocytosis which we have followed and monitored and feel they are stress

related.  She also does have notable atherosclerotic cardiovascular disease,

including hypertension and hyperlipidemia, as well as chronic kidney disease

stage III, osteoporosis, vitamin D deficiency, depression/anxiety, and history of

a cystocele.  She is status post total hysterectomy in 1993 and bilateral

cataract surgery in 2007, and then the open reduction internal fixation of the

left humerus in July of 2017.

 

PROCEDURES ON THIS UNIT:  None.

 

DIAGNOSTIC AND LABORATORY DATA:

Patient with white blood cell count of 18.3 on admission that dipped down during

the course of the admission and bounced to 17.5 and bounced back up to 19.2

without fever, chills, or any other signs of infection in spite of an elevation

of neutrophils and immature granulocytes.  The patient has not been anemic during

the course of the admission with a hemoglobin and hematocrit on discharge of 13.8

and 44.1%.  However, her platelet count has been elevated at 628,000 on admission

and remained elevated at 698,000 at discharge.

 

Chemistry shows the patient with normal electrolytes on admission and near normal

at discharge, with only the chloride abnormal at 108, being mildly elevated.  BUN

on admission 24 and at discharge is 23, and creatinine has remained stable.

Calcium has declined from 8.6 to 8.0 during the course of the admission and

patient has an albumin of 2.4, but liver function tests have not been elevated.

The patient has been on Coumadin management and her INR has consistently been at

about the target range of 1.7 to 2.0 and on discharge today she is 1.99.

 

HOSPITAL COURSE:  The patient is admitted to the acute rehabilitation unit on

10/03/2017, showing very good motivation and attention to work and detail.  The

patient, on admission, was standby assist in most of her transfers to contact

guard, but bathing and lower body dressing required minimal assistance and her

standing balance was fair plus.  The patient, however, has advanced to modified

independent in transfers with lower body dressing now standby assistance and

standing static balance up to good and dynamic at good minus.  In physical

therapy, patient started at ambulating 30 feet with contact guard assistance and

not being able to do stairs, progressing to 50% weightbearing using a

front-wheeled walker and having independent to modified independent in all bed

mobilities and transfers, ambulating 305 feet with modified independence using a

front-wheeled walker, and able to perform two stairs.  The patient, overall, has

been medically stable in spite of the abnormal white blood cell count and

platelet count, which will be further evaluated by her primary care.

 

DISCHARGE MEDICATIONS:

- Coumadin 2.5 mg every other day, to have monitoring by home care nursing

- Tylenol

- Wellbutrin  mg daily

- Calcitrate 950 mg monthly

- clonidine 0.1 mg per 24 hour patch, change weekly

- isosorbide mononitrate extended release 3 mg daily

- metoprolol tartrate 50 mg twice a day for hypertension

- Zofran every 4 hours as needed severe nausea or vomiting

- MiraLAX one packet daily for bowel program

- Senokot-S one tablet twice a day for bowel program

- simvastatin 20 mg daily for cholesterol

- vitamin D 50,000 units monthly

 

COMPLICATIONS:  None.

 

DISCHARGE PLAN INSTRUCTIONS:  Patient discharged to home with home care including

nursing and monitoring of prothrombin time and INR and reporting to [facility]

[facility]th[facility].  The patient is to return to that clinic within about 2 weeks and

to see her primary care within 2 weeks.

 

TIME SPENT ON DISCHARGE:  Greater than 35 minutes.

## 2017-12-03 NOTE — REP
Clinical:  Trauma.

 

Technique:  Frontal view of the chest with multiple views of the left

hemithorax.

 

Findings:

Frontal view of the chest demonstrates no acute cardiopulmonary process.

Multiple views of the left hemithorax demonstrates no obvious acute rib fracture

or pathology.

 

Impression:

No obvious acute left rib fracture identified.

 

 

Signed by

Stuart De La Fuente MD 12/03/2017 10:11 P

## 2018-04-23 ENCOUNTER — HOSPITAL ENCOUNTER (OUTPATIENT)
Dept: HOSPITAL 53 - M SFHCADAM | Age: 83
End: 2018-04-23
Attending: FAMILY MEDICINE
Payer: COMMERCIAL

## 2018-04-23 DIAGNOSIS — I10: ICD-10-CM

## 2018-04-23 DIAGNOSIS — Z79.899: ICD-10-CM

## 2018-04-23 DIAGNOSIS — M81.0: Primary | ICD-10-CM

## 2018-04-23 DIAGNOSIS — H91.90: ICD-10-CM

## 2018-04-23 LAB
25(OH)D3 SERPL-MCNC: 25.2 NG/ML (ref 30–100)
ALBUMIN/GLOBULIN RATIO: 1.3 (ref 1–1.93)
ALBUMIN: 3.9 GM/DL (ref 3.2–5.2)
ALKALINE PHOSPHATASE: 108 U/L (ref 45–117)
ALT SERPL W P-5'-P-CCNC: 23 U/L (ref 12–78)
ANION GAP: 6 MEQ/L (ref 8–16)
AST SERPL-CCNC: 20 U/L (ref 7–37)
BASO #: 0.4 10^3/UL (ref 0–0.2)
BASO %: 1.4 % (ref 0–1)
BILIRUBIN,TOTAL: 0.5 MG/DL (ref 0.2–1)
BLOOD UREA NITROGEN: 38 MG/DL (ref 7–18)
CALCIUM LEVEL: 9.1 MG/DL (ref 8.8–10.2)
CARBON DIOXIDE LEVEL: 31 MEQ/L (ref 21–32)
CHLORIDE LEVEL: 105 MEQ/L (ref 98–107)
CREATININE FOR GFR: 1.43 MG/DL (ref 0.55–1.3)
EOS #: 1.1 10^3/UL (ref 0–0.5)
EOSINOPHIL NFR BLD AUTO: 4 % (ref 0–3)
FREE T4: 0.85 NG/DL (ref 0.76–1.46)
GFR SERPL CREATININE-BSD FRML MDRD: 36.9 ML/MIN/{1.73_M2} (ref 32–?)
GLUCOSE, FASTING: 94 MG/DL (ref 70–100)
HEMATOCRIT: 55.6 % (ref 36–47)
HEMOGLOBIN: 16.4 G/DL (ref 12–15.5)
IMMATURE GRANULOCYTE %: 1.1 % (ref 0–3)
LYMPH #: 1.8 10^3/UL (ref 1.5–4.5)
LYMPH %: 6.2 % (ref 24–44)
MEAN CORPUSCULAR HEMOGLOBIN: 23.1 PG (ref 27–33)
MEAN CORPUSCULAR HGB CONC: 29.5 G/DL (ref 32–36.5)
MEAN CORPUSCULAR VOLUME: 78.4 FL (ref 80–96)
MONO #: 3.2 10^3/UL (ref 0–0.8)
MONO %: 11.2 % (ref 0–5)
NEUTROPHILS #: 21.4 10^3/UL (ref 1.8–7.7)
NEUTROPHILS %: 76.1 % (ref 36–66)
NRBC BLD AUTO-RTO: 0 % (ref 0–0)
PLATELET COUNT, AUTOMATED: 985 10^3/UL (ref 150–450)
POSITIVE DIFF: (no result)
POTASSIUM SERUM: 4.4 MEQ/L (ref 3.5–5.1)
RED BLOOD COUNT: 7.09 10^6/UL (ref 4–5.4)
RED CELL DISTRIBUTION WIDTH: 17.2 % (ref 11.5–14.5)
SODIUM LEVEL: 142 MEQ/L (ref 136–145)
THYROID STIMULATING HORMONE: 2.83 UIU/ML (ref 0.36–3.74)
TOTAL PROTEIN: 6.9 GM/DL (ref 6.4–8.2)
WHITE BLOOD COUNT: 28.1 10^3/UL (ref 4–10)

## 2018-04-23 PROCEDURE — 84443 ASSAY THYROID STIM HORMONE: CPT

## 2018-04-25 ENCOUNTER — HOSPITAL ENCOUNTER (OUTPATIENT)
Dept: HOSPITAL 53 - M SFHCADAM | Age: 83
End: 2018-04-25
Attending: FAMILY MEDICINE
Payer: COMMERCIAL

## 2018-04-25 DIAGNOSIS — R79.89: Primary | ICD-10-CM

## 2018-04-25 LAB
REASON FOR REVIEW: (no result)
SOURCE: (no result)

## 2018-05-09 ENCOUNTER — HOSPITAL ENCOUNTER (OUTPATIENT)
Dept: HOSPITAL 53 - M SFHCADAM | Age: 83
End: 2018-05-09
Attending: FAMILY MEDICINE
Payer: COMMERCIAL

## 2018-05-09 DIAGNOSIS — R79.89: Primary | ICD-10-CM

## 2018-05-09 PROCEDURE — 88300 SURGICAL PATH GROSS: CPT

## 2018-09-25 ENCOUNTER — HOSPITAL ENCOUNTER (OUTPATIENT)
Dept: HOSPITAL 53 - M LAB REF | Age: 83
End: 2018-09-25
Payer: COMMERCIAL

## 2018-09-25 DIAGNOSIS — D47.1: Primary | ICD-10-CM

## 2018-09-25 DIAGNOSIS — Z79.899: ICD-10-CM

## 2018-09-25 LAB
BASO #: 0.1 10^3/UL (ref 0–0.2)
BASO %: 1.2 % (ref 0–1)
EOS #: 0.3 10^3/UL (ref 0–0.5)
EOSINOPHIL NFR BLD AUTO: 2.9 % (ref 0–3)
HEMATOCRIT: 60.4 % (ref 36–47)
HEMOGLOBIN: 18.7 G/DL (ref 12–15.5)
IMMATURE GRANULOCYTE %: 0.3 % (ref 0–3)
LYMPH #: 1.1 10^3/UL (ref 1.5–4.5)
LYMPH %: 9.7 % (ref 24–44)
MEAN CORPUSCULAR HEMOGLOBIN: 25.3 PG (ref 27–33)
MEAN CORPUSCULAR HGB CONC: 31 G/DL (ref 32–36.5)
MEAN CORPUSCULAR VOLUME: 81.8 FL (ref 80–96)
MONO #: 1.7 10^3/UL (ref 0–0.8)
MONO %: 14.8 % (ref 0–5)
NEUTROPHILS #: 8.1 10^3/UL (ref 1.8–7.7)
NEUTROPHILS %: 71.1 % (ref 36–66)
NRBC BLD AUTO-RTO: 0 % (ref 0–0)
PLATELET COUNT, AUTOMATED: 611 10^3/UL (ref 150–450)
RED BLOOD COUNT: 7.38 10^6/UL (ref 4–5.4)
RED CELL DISTRIBUTION WIDTH: 21.7 % (ref 11.5–14.5)
WHITE BLOOD COUNT: 11.5 10^3/UL (ref 4–10)

## 2018-09-25 PROCEDURE — 85025 COMPLETE CBC W/AUTO DIFF WBC: CPT

## 2018-10-09 ENCOUNTER — HOSPITAL ENCOUNTER (OUTPATIENT)
Dept: HOSPITAL 53 - M LAB REF | Age: 83
End: 2018-10-09
Attending: INTERNAL MEDICINE
Payer: COMMERCIAL

## 2018-10-09 DIAGNOSIS — D47.1: Primary | ICD-10-CM

## 2018-10-09 LAB
BASO #: 0.1 10^3/UL (ref 0–0.2)
BASO %: 1 % (ref 0–1)
EOS #: 0.3 10^3/UL (ref 0–0.5)
EOSINOPHIL NFR BLD AUTO: 2.4 % (ref 0–3)
HEMATOCRIT: 54.1 % (ref 36–47)
HEMOGLOBIN: 16.8 G/DL (ref 12–15.5)
IMMATURE GRANULOCYTE %: 0.4 % (ref 0–3)
LYMPH #: 1 10^3/UL (ref 1.5–4.5)
LYMPH %: 7.5 % (ref 24–44)
MEAN CORPUSCULAR HEMOGLOBIN: 25.9 PG (ref 27–33)
MEAN CORPUSCULAR HGB CONC: 31.1 G/DL (ref 32–36.5)
MEAN CORPUSCULAR VOLUME: 83.5 FL (ref 80–96)
MONO #: 1.7 10^3/UL (ref 0–0.8)
MONO %: 12.7 % (ref 0–5)
NEUTROPHILS #: 9.9 10^3/UL (ref 1.8–7.7)
NEUTROPHILS %: 76 % (ref 36–66)
NRBC BLD AUTO-RTO: 0 % (ref 0–0)
PLATELET COUNT, AUTOMATED: 496 10^3/UL (ref 150–450)
RED BLOOD COUNT: 6.48 10^6/UL (ref 4–5.4)
RED CELL DISTRIBUTION WIDTH: 22.7 % (ref 11.5–14.5)
WHITE BLOOD COUNT: 13 10^3/UL (ref 4–10)

## 2018-10-09 PROCEDURE — 85025 COMPLETE CBC W/AUTO DIFF WBC: CPT

## 2018-10-26 NOTE — HPEPDOC
General


Date of Admission


Sep 28, 2017 at 11:21


Primary Care Physician:  Marilin Han PA-C, LAC


Chief Complaint


The patient is a 88-year-old female Presented to the ER with right hip pain 

after she had fallen outside her home.





History of Present Illness


Patient is an 88 year old  female with a PMHx of HTN, DLP, CKD3, 

Osteoporosis, Vitamin D deficiency, Depression / Anxiety and Hx of a Cystocele 

who presented to the ER with complaints of right hip pain after she had fallen 

at home. 





Patient was outside walking her dog and she noted that she may have tripped on 

a tree stump and fallen backward on to her buttock. She noted that did not 

loose consciousness, did not have any head trauma. She denied any symptoms that 

caused her to fall. Denied any chest pain, shortness of breath, dizziness or 

light-headeness.





When patient was on the ground she tried to get up, but noted that she could 

not because of severe right hip pain. She called for her daughter in law who 

was unable to help her up. After some time, her son was contacted and they were 

able to lift her into a car and bring her to the ER.





In the ER patient had imaging done that was consistent with a Right hip 

fracture. Hospitalist team was called for admission given her advanced age and 

comorbidities and orthopedic surgery was consulted for her fractured hip. 





Patient denied any sensory loss, muscles weakness, incontinence, chest pain, 

shortness of breath or cough. She denied abdominal pain, nausea, vomiting, 

diarrhea or dysuria. She does note occasional constipation. She denied any 

fever or chills recently.





Patient does not have a history of MI / Stroke / CHF or Arrhythmias. She does 

not have a cardiologist outside the hospital. She is noted to walk regularly of 

400 feet daily and go up a flight of stairs without experiencing any chest pain 

or shortness of breath. Patient recently had a surgery on 7/2017 for a left 

humerus fracture with Dr. IRAIS Montano that was un-eventful.





Home Medications


Scheduled


Bupropion Hcl (Bupropion HCl Xl) 150 Mg Tab, 150 MG PO DAILY, (Reported)


Calcium Citrate (Calcitrate) 950 Mg Tab, 950 MG PO QMONTH, (Reported)


Clonidine Hydrochloride (Clonidine HCl) 0.1 Mg/24 Hr Dis, 0.1 MG TD QWEEK, (

Reported)


   MONDAYS-CURRENTLY ON RIGHT ARM 


Hydrochlorothiazide (Hydrochlorothiazide) 25 Mg Tab, 25 MG PO DAILY, (Reported)


Irbesartan (Irbesartan) 300 Mg Tab, 300 MG PO DAILY, (Reported)


Isosorbide Mononitrate (Isosorbide Mononitrate ER) 30 Mg Tab, 30 MG PO DAILY, (

Reported)


Metoprolol Tartrate (Metoprolol Tartrate) 100 Mg Tab, 100 MG PO DAILY, (Reported

)


Simvastatin (Simvastatin) 20 Mg Tab, 20 MG PO DAILY, (Reported)


Vitamin D (Drisdol) 50,000 Unit Cap, 50,000 UNIT PO QMONTH, (Reported)





Allergies


Coded Allergies:  


     No Known Allergies (Unverified , 5/10/17)





Past Medical History


Medical History


See HPI


Surgical History


Hysterectomy and bilateral salpingo-opherectomy (1993)


Bilateral cataract surgery (2007)


Left humerus fracture  s/p ORIF (7/2017)


Cystocele (not corrected)





Family History


- Non-contributory given advanced age





Social History


- Denies the use of illicit drugs; Quit smoking 50 years prior, smoker of 20 

years at 1 ppd, Social alcohol use 


- Denies recent travel or sick contacts


- Lives with son and daughter in law


- Occupation; Retired from school cafeteria





Review of Symptoms


Other systems


Negative otherwise stated in HPI





Vital Signs


- Vitals: /86, HR 77, RR 18, Sat 94%RA, Temp 98.0F


- General: Lying in bed, No acute distress, Speaking in full sentences, AAOx3


- HEENT: NC, AT, PERRLA, EOMI


- CVS: RRR, +S1S2


- Lungs: Fair air entry bilaterally, Clear to auscultation, No wheezing / rales 

/ rhonchi 


- Abdomen: Soft, Non-distended, Non-tender


- Extremities: + PPx4, No lower extremity edema, No calf tenderness, Right leg 

externally rotated and shorter than left


- Neuro: Sensory deficit; limited ROM of right hip 


- Skin: No visible rashes





Laboratory Data


Labs 24H


Laboratory Tests 2


9/28/17 09:17: 


White Blood Count 26.5H, Red Blood Count 6.84H, Hemoglobin 17.2H, Hematocrit 

55.2H, Mean Corpuscular Volume 80.7, Mean Corpuscular Hemoglobin 25.1L, Mean 

Corpuscular Hemoglobin Concent 31.2L, Red Cell Distribution Width 14.2, 

Platelet Count 719H, Neutrophils (%) (Auto) 82.2H, Lymphocytes (%) (Auto) 5.3L, 

Monocytes (%) (Auto) 8.0H, Eosinophils (%) (Auto) 1.4, Basophils (%) (Auto) 1.0

, Neutrophils # (Auto) 21.8H, Lymphocytes # (Auto) 1.4L, Monocytes # (Auto) 2.1H

, Eosinophils # (Auto) 0.4, Basophils # (Auto) 0.3H, Immature Granulocyte # (

Auto) 0.6H, Nucleated Red Blood Cells % (auto) 0.0, Anion Gap 8, Glomerular 

Filtration Rate 34.9, Blood Urea Nitrogen 37H, Creatinine 1.50H, Sodium Level 

139, Potassium Level 4.2, Chloride Level 103, Carbon Dioxide Level 28, Calcium 

Level 9.5


9/28/17 10:00: 


Prothrombin Time 14.0, Prothromb Time International Ratio 1.07, Activated 

Partial Thromboplast Time 52.2H, Mix PTT Patient/Normal 1:1 41.3


9/28/17 10:38: 


Differential Slide Review Report, Differential Pathologist's Review 

COMPREHENSIVE REVIEW, Peripheral Blood Smear Path Consult PERIPHERAL SMEAR, 

Lactic Acid Level 1.3, Total Creatine Kinase 47, Creatine Kinase MB 1.0, 

Creatine Kinase MB Relative Index 2.12, Troponin I 0.02, NT-Pro-B-Type 

Natriuretic Peptide 1299H


9/28/17 11:12: 


Urine Appearance HAZY, Urine Color YELLOW, Urine pH 5.0, Urine Specific Gravity 

1.014, Urine Protein NEGATIVE, Urine Glucose (UA) NEGATIVE, Urine Ketones 

NEGATIVE, Urine Urobilinogen 0.2, Urine Bilirubin NEGATIVE, Urine Leukocyte 

Esterase NEGATIVE, Urine Blood 1+H, Urine Nitrite POSITIVE, Urine WBC (Auto) 3, 

Urine RBC (Auto) 3, Urine Hyaline Casts (Auto) 0, Urine Bacteria (Auto) 1+H, 

Urine Squamous Epithelial Cells 0, Urine Mucus (Auto) SMALL, Urine Sperm (Auto)


CBC/BMP


Laboratory Tests


9/28/17 09:17








Red Blood Count 6.84 H, Mean Corpuscular Volume 80.7, Mean Corpuscular 

Hemoglobin 25.1 L, Mean Corpuscular Hemoglobin Concent 31.2 L, Red Cell 

Distribution Width 14.2, Neutrophils (%) (Auto) 82.2 H, Lymphocytes (%) (Auto) 

5.3 L, Monocytes (%) (Auto) 8.0 H, Eosinophils (%) (Auto) 1.4, Basophils (%) (

Auto) 1.0, Neutrophils # (Auto) 21.8 H, Lymphocytes # (Auto) 1.4 L, Monocytes # 

(Auto) 2.1 H, Eosinophils # (Auto) 0.4, Basophils # (Auto) 0.3 H, Calcium Level 

9.5





Plan / VTE


VTE Prophylaxis Ordered?:  Yes





Plan


Plan


Right hip pain  likely 2/2 right hip fracture at femoral neck


- Presented after a mechanical fall at home


- Physical with an external rotated and shorter leg


- Distal pulses intact and is able to move distal portion of extremity, limited 

ROM of right hip


- EKG reviewed; QTc of 439, NSR at HR of 77, artifact present at baseline, no T-

wave changes noted; compared to 7/21/17


- Troponin x1 negative 


- Medically cleared for surgery today; no cardiac history noted


- Dr. Rodney consulted for surgery


- Pain control and anticoagulation as per orthopedic surgery





Elevated WBC / Elevated Platelet Count / Elevated Hg


- Labs compared to 7/2017 appear consistent with current labs


- Afebrile and review of systems remains negative


- UA and CXR does not appear to show any signs of infection 


- Will sent for LAP score and peripheral smear


- Will consider starting ASA 81 daily after surgery


- c/w IV fluid hydration for now 





HTN


- c/w Clonidine patch and Metoprolol


- Will hold Irbesartan and HCTZ





DLP


- c/w Simvastatin 





Elevated Cr on CKD3


- Cr baseline appears to be between 1.2 and 1.3


- Currently at 1.5


- Will start IV fluid hydration 





Osteoporosis


- Will need to f/u outpatient with PCP 





Vitamin D deficiency


- Will hold supplementation 





Depression / Anxiety


- c/w Bupropion 





Hx of a Cystocele





DVT prophylaxis 


- Will start SCDs for now











TOLU METZ MD Sep 28, 2017 15:57 Cognitively Impaired

## 2018-12-14 ENCOUNTER — HOSPITAL ENCOUNTER (OUTPATIENT)
Dept: HOSPITAL 53 - M SFHCADAM | Age: 83
End: 2018-12-14
Attending: FAMILY MEDICINE
Payer: COMMERCIAL

## 2018-12-14 ENCOUNTER — HOSPITAL ENCOUNTER (OUTPATIENT)
Dept: HOSPITAL 53 - M ADAMS | Age: 83
End: 2018-12-14
Attending: FAMILY MEDICINE
Payer: COMMERCIAL

## 2018-12-14 DIAGNOSIS — Z53.8: ICD-10-CM

## 2018-12-14 DIAGNOSIS — D47.1: ICD-10-CM

## 2018-12-14 DIAGNOSIS — Z23: ICD-10-CM

## 2018-12-14 DIAGNOSIS — R73.01: ICD-10-CM

## 2018-12-14 DIAGNOSIS — I10: Primary | ICD-10-CM

## 2018-12-14 LAB
ALBUMIN SERPL BCG-MCNC: 4 GM/DL (ref 3.2–5.2)
ALT SERPL W P-5'-P-CCNC: 26 U/L (ref 12–78)
BASOPHILS # BLD AUTO: 0.2 10^3/UL (ref 0–0.2)
BASOPHILS NFR BLD AUTO: 1.5 % (ref 0–1)
BILIRUB SERPL-MCNC: 0.5 MG/DL (ref 0.2–1)
BUN SERPL-MCNC: 32 MG/DL (ref 7–18)
CALCIUM SERPL-MCNC: 9 MG/DL (ref 8.8–10.2)
CHLORIDE SERPL-SCNC: 104 MEQ/L (ref 98–107)
CO2 SERPL-SCNC: 30 MEQ/L (ref 21–32)
CREAT SERPL-MCNC: 1.42 MG/DL (ref 0.55–1.3)
EOSINOPHIL # BLD AUTO: 0.4 10^3/UL (ref 0–0.5)
EOSINOPHIL NFR BLD AUTO: 2.2 % (ref 0–3)
GFR SERPL CREATININE-BSD FRML MDRD: 37.1 ML/MIN/{1.73_M2} (ref 32–?)
GLUCOSE SERPL-MCNC: 91 MG/DL (ref 70–100)
HCT VFR BLD AUTO: 54.9 % (ref 36–47)
HGB BLD-MCNC: 17.6 G/DL (ref 12–15.5)
LYMPHOCYTES # BLD AUTO: 1.2 10^3/UL (ref 1.5–4.5)
LYMPHOCYTES NFR BLD AUTO: 7.1 % (ref 24–44)
MCH RBC QN AUTO: 30.3 PG (ref 27–33)
MCHC RBC AUTO-ENTMCNC: 32.1 G/DL (ref 32–36.5)
MCV RBC AUTO: 94.7 FL (ref 80–96)
MONOCYTES # BLD AUTO: 2.1 10^3/UL (ref 0–0.8)
MONOCYTES NFR BLD AUTO: 13.1 % (ref 0–5)
NEUTROPHILS # BLD AUTO: 12.2 10^3/UL (ref 1.8–7.7)
NEUTROPHILS NFR BLD AUTO: 75.6 % (ref 36–66)
PLATELET # BLD AUTO: 559 10^3/UL (ref 150–450)
POTASSIUM SERPL-SCNC: 4.6 MEQ/L (ref 3.5–5.1)
PROT SERPL-MCNC: 7 GM/DL (ref 6.4–8.2)
RBC # BLD AUTO: 5.8 10^6/UL (ref 4–5.4)
SODIUM SERPL-SCNC: 141 MEQ/L (ref 136–145)
WBC # BLD AUTO: 16.1 10^3/UL (ref 4–10)

## 2018-12-14 PROCEDURE — 90682 RIV4 VACC RECOMBINANT DNA IM: CPT

## 2018-12-14 PROCEDURE — 85025 COMPLETE CBC W/AUTO DIFF WBC: CPT

## 2018-12-14 PROCEDURE — 80053 COMPREHEN METABOLIC PANEL: CPT

## 2018-12-14 PROCEDURE — 90670 PCV13 VACCINE IM: CPT

## 2019-01-22 ENCOUNTER — HOSPITAL ENCOUNTER (OUTPATIENT)
Dept: HOSPITAL 53 - M LABDRWAD | Age: 84
End: 2019-01-22
Payer: COMMERCIAL

## 2019-01-22 DIAGNOSIS — D47.1: Primary | ICD-10-CM

## 2019-01-22 LAB
ALBUMIN SERPL BCG-MCNC: 3.9 GM/DL (ref 3.2–5.2)
ALT SERPL W P-5'-P-CCNC: 21 U/L (ref 12–78)
BASOPHILS # BLD AUTO: 0.1 10^3/UL (ref 0–0.2)
BASOPHILS NFR BLD AUTO: 1 % (ref 0–1)
BILIRUB SERPL-MCNC: 0.5 MG/DL (ref 0.2–1)
BUN SERPL-MCNC: 37 MG/DL (ref 7–18)
CALCIUM SERPL-MCNC: 9 MG/DL (ref 8.8–10.2)
CHLORIDE SERPL-SCNC: 103 MEQ/L (ref 98–107)
CO2 SERPL-SCNC: 30 MEQ/L (ref 21–32)
CREAT SERPL-MCNC: 1.52 MG/DL (ref 0.55–1.3)
EOSINOPHIL # BLD AUTO: 0.3 10^3/UL (ref 0–0.5)
EOSINOPHIL NFR BLD AUTO: 2.2 % (ref 0–3)
GFR SERPL CREATININE-BSD FRML MDRD: 34.3 ML/MIN/{1.73_M2} (ref 32–?)
GLUCOSE SERPL-MCNC: 85 MG/DL (ref 70–100)
HCT VFR BLD AUTO: 53.2 % (ref 36–47)
HGB BLD-MCNC: 16.8 G/DL (ref 12–15.5)
LYMPHOCYTES # BLD AUTO: 1.1 10^3/UL (ref 1.5–4.5)
LYMPHOCYTES NFR BLD AUTO: 8.2 % (ref 24–44)
MCH RBC QN AUTO: 32.4 PG (ref 27–33)
MCHC RBC AUTO-ENTMCNC: 31.6 G/DL (ref 32–36.5)
MCV RBC AUTO: 102.5 FL (ref 80–96)
MONOCYTES # BLD AUTO: 2 10^3/UL (ref 0–0.8)
MONOCYTES NFR BLD AUTO: 14.5 % (ref 0–5)
NEUTROPHILS # BLD AUTO: 10.2 10^3/UL (ref 1.8–7.7)
NEUTROPHILS NFR BLD AUTO: 73.5 % (ref 36–66)
PLATELET # BLD AUTO: 561 10^3/UL (ref 150–450)
POTASSIUM SERPL-SCNC: 4.9 MEQ/L (ref 3.5–5.1)
PROT SERPL-MCNC: 6.5 GM/DL (ref 6.4–8.2)
RBC # BLD AUTO: 5.19 10^6/UL (ref 4–5.4)
SODIUM SERPL-SCNC: 139 MEQ/L (ref 136–145)
WBC # BLD AUTO: 13.9 10^3/UL (ref 4–10)